# Patient Record
Sex: FEMALE | Race: WHITE | Employment: FULL TIME | URBAN - METROPOLITAN AREA
[De-identification: names, ages, dates, MRNs, and addresses within clinical notes are randomized per-mention and may not be internally consistent; named-entity substitution may affect disease eponyms.]

---

## 2017-04-15 RX ORDER — BUPROPION HYDROCHLORIDE 200 MG/1
TABLET, EXTENDED RELEASE ORAL
Qty: 180 TAB | Refills: 1 | Status: SHIPPED | OUTPATIENT
Start: 2017-04-15 | End: 2017-10-12 | Stop reason: SDUPTHER

## 2017-07-10 DIAGNOSIS — M54.2 NECK PAIN: Primary | ICD-10-CM

## 2017-08-04 ENCOUNTER — HOSPITAL ENCOUNTER (OUTPATIENT)
Dept: MAMMOGRAPHY | Age: 52
Discharge: HOME | End: 2017-08-04
Payer: COMMERCIAL

## 2017-08-04 DIAGNOSIS — Z12.31 VISIT FOR SCREENING MAMMOGRAM: ICD-10-CM

## 2017-08-04 PROCEDURE — 77067 SCR MAMMO BI INCL CAD: CPT

## 2017-11-01 RX ORDER — BUPROPION HYDROCHLORIDE 200 MG/1
TABLET, EXTENDED RELEASE ORAL
Qty: 180 TAB | Refills: 1 | Status: SHIPPED | OUTPATIENT
Start: 2017-11-01 | End: 2018-06-18 | Stop reason: SDUPTHER

## 2017-11-17 ENCOUNTER — APPOINTMENT (OUTPATIENT)
Dept: CT IMAGING | Age: 52
End: 2017-11-17
Attending: EMERGENCY MEDICINE
Payer: COMMERCIAL

## 2017-11-17 ENCOUNTER — HOSPITAL ENCOUNTER (OUTPATIENT)
Age: 52
Setting detail: OBSERVATION
Discharge: HOME | End: 2017-11-18
Attending: EMERGENCY MEDICINE | Admitting: FAMILY MEDICINE
Payer: COMMERCIAL

## 2017-11-17 DIAGNOSIS — R26.81 UNSTEADY GAIT: Primary | ICD-10-CM

## 2017-11-17 DIAGNOSIS — M54.2 NECK PAIN: ICD-10-CM

## 2017-11-17 DIAGNOSIS — G45.0 VERTEBROBASILAR ARTERY SYNDROME: ICD-10-CM

## 2017-11-17 PROBLEM — G45.9 TIA (TRANSIENT ISCHEMIC ATTACK): Status: ACTIVE | Noted: 2017-11-17

## 2017-11-17 LAB
ALBUMIN SERPL-MCNC: 4.1 G/DL (ref 3.5–5)
ALBUMIN/GLOB SERPL: 1.2 {RATIO} (ref 1.1–2.2)
ALP SERPL-CCNC: 61 U/L (ref 45–117)
ALT SERPL-CCNC: 39 U/L (ref 12–78)
ANION GAP SERPL CALC-SCNC: 7 MMOL/L (ref 5–15)
APTT PPP: 28 SEC (ref 22.1–32.5)
AST SERPL-CCNC: 21 U/L (ref 15–37)
BASOPHILS # BLD: 0 K/UL (ref 0–0.1)
BASOPHILS NFR BLD: 0 % (ref 0–1)
BILIRUB SERPL-MCNC: 0.6 MG/DL (ref 0.2–1)
BUN SERPL-MCNC: 15 MG/DL (ref 6–20)
BUN/CREAT SERPL: 16 (ref 12–20)
CALCIUM SERPL-MCNC: 9.5 MG/DL (ref 8.5–10.1)
CHLORIDE SERPL-SCNC: 104 MMOL/L (ref 97–108)
CO2 SERPL-SCNC: 29 MMOL/L (ref 21–32)
CREAT SERPL-MCNC: 0.94 MG/DL (ref 0.55–1.02)
EOSINOPHIL # BLD: 0.1 K/UL (ref 0–0.4)
EOSINOPHIL NFR BLD: 1 % (ref 0–7)
ERYTHROCYTE [DISTWIDTH] IN BLOOD BY AUTOMATED COUNT: 12.2 % (ref 11.5–14.5)
GLOBULIN SER CALC-MCNC: 3.4 G/DL (ref 2–4)
GLUCOSE SERPL-MCNC: 94 MG/DL (ref 65–100)
HCT VFR BLD AUTO: 40.9 % (ref 35–47)
HGB BLD-MCNC: 13.7 G/DL (ref 11.5–16)
INR BLD: 1 (ref 0.9–1.2)
INR PPP: 1.1 (ref 0.9–1.1)
LYMPHOCYTES # BLD: 1.5 K/UL (ref 0.8–3.5)
LYMPHOCYTES NFR BLD: 31 % (ref 12–49)
MCH RBC QN AUTO: 32.5 PG (ref 26–34)
MCHC RBC AUTO-ENTMCNC: 33.5 G/DL (ref 30–36.5)
MCV RBC AUTO: 96.9 FL (ref 80–99)
MONOCYTES # BLD: 0.4 K/UL (ref 0–1)
MONOCYTES NFR BLD: 7 % (ref 5–13)
NEUTS SEG # BLD: 3 K/UL (ref 1.8–8)
NEUTS SEG NFR BLD: 61 % (ref 32–75)
PLATELET # BLD AUTO: 221 K/UL (ref 150–400)
POTASSIUM SERPL-SCNC: 3.7 MMOL/L (ref 3.5–5.1)
PROT SERPL-MCNC: 7.5 G/DL (ref 6.4–8.2)
PROTHROMBIN TIME: 11.2 SEC (ref 9–11.1)
RBC # BLD AUTO: 4.22 M/UL (ref 3.8–5.2)
SODIUM SERPL-SCNC: 140 MMOL/L (ref 136–145)
THERAPEUTIC RANGE,PTTT: NORMAL SECS (ref 58–77)
TROPONIN I SERPL-MCNC: <0.04 NG/ML
WBC # BLD AUTO: 4.9 K/UL (ref 3.6–11)

## 2017-11-17 PROCEDURE — 70496 CT ANGIOGRAPHY HEAD: CPT

## 2017-11-17 PROCEDURE — 36415 COLL VENOUS BLD VENIPUNCTURE: CPT | Performed by: EMERGENCY MEDICINE

## 2017-11-17 PROCEDURE — 93005 ELECTROCARDIOGRAM TRACING: CPT

## 2017-11-17 PROCEDURE — 74011250637 HC RX REV CODE- 250/637: Performed by: FAMILY MEDICINE

## 2017-11-17 PROCEDURE — 85025 COMPLETE CBC W/AUTO DIFF WBC: CPT | Performed by: EMERGENCY MEDICINE

## 2017-11-17 PROCEDURE — 70450 CT HEAD/BRAIN W/O DYE: CPT

## 2017-11-17 PROCEDURE — 99285 EMERGENCY DEPT VISIT HI MDM: CPT

## 2017-11-17 PROCEDURE — 80053 COMPREHEN METABOLIC PANEL: CPT | Performed by: EMERGENCY MEDICINE

## 2017-11-17 PROCEDURE — 74011636320 HC RX REV CODE- 636/320: Performed by: EMERGENCY MEDICINE

## 2017-11-17 PROCEDURE — 70498 CT ANGIOGRAPHY NECK: CPT

## 2017-11-17 PROCEDURE — 99218 HC RM OBSERVATION: CPT

## 2017-11-17 PROCEDURE — 85730 THROMBOPLASTIN TIME PARTIAL: CPT | Performed by: EMERGENCY MEDICINE

## 2017-11-17 PROCEDURE — 85610 PROTHROMBIN TIME: CPT | Performed by: EMERGENCY MEDICINE

## 2017-11-17 PROCEDURE — 74011000258 HC RX REV CODE- 258: Performed by: EMERGENCY MEDICINE

## 2017-11-17 PROCEDURE — 84484 ASSAY OF TROPONIN QUANT: CPT | Performed by: EMERGENCY MEDICINE

## 2017-11-17 PROCEDURE — 85610 PROTHROMBIN TIME: CPT

## 2017-11-17 RX ORDER — DIAZEPAM 5 MG/1
5 TABLET ORAL
Status: DISCONTINUED | OUTPATIENT
Start: 2017-11-17 | End: 2017-11-17

## 2017-11-17 RX ORDER — SODIUM CHLORIDE 9 MG/ML
75 INJECTION, SOLUTION INTRAVENOUS CONTINUOUS
Status: DISCONTINUED | OUTPATIENT
Start: 2017-11-17 | End: 2017-11-18 | Stop reason: HOSPADM

## 2017-11-17 RX ORDER — ESCITALOPRAM OXALATE 10 MG/1
10 TABLET ORAL DAILY
Status: DISCONTINUED | OUTPATIENT
Start: 2017-11-18 | End: 2017-11-17

## 2017-11-17 RX ORDER — ALPRAZOLAM 0.25 MG/1
0.25 TABLET ORAL
Status: DISCONTINUED | OUTPATIENT
Start: 2017-11-17 | End: 2017-11-18 | Stop reason: HOSPADM

## 2017-11-17 RX ORDER — BUPROPION HYDROCHLORIDE 100 MG/1
200 TABLET, EXTENDED RELEASE ORAL DAILY
Status: DISCONTINUED | OUTPATIENT
Start: 2017-11-18 | End: 2017-11-17

## 2017-11-17 RX ORDER — PRAVASTATIN SODIUM 40 MG/1
40 TABLET ORAL
Status: DISCONTINUED | OUTPATIENT
Start: 2017-11-17 | End: 2017-11-18 | Stop reason: HOSPADM

## 2017-11-17 RX ORDER — ACETAMINOPHEN 325 MG/1
650 TABLET ORAL
Status: DISCONTINUED | OUTPATIENT
Start: 2017-11-17 | End: 2017-11-18 | Stop reason: HOSPADM

## 2017-11-17 RX ORDER — ONDANSETRON 2 MG/ML
4 INJECTION INTRAMUSCULAR; INTRAVENOUS
Status: DISCONTINUED | OUTPATIENT
Start: 2017-11-17 | End: 2017-11-18 | Stop reason: HOSPADM

## 2017-11-17 RX ORDER — GUAIFENESIN 100 MG/5ML
81 LIQUID (ML) ORAL DAILY
Status: DISCONTINUED | OUTPATIENT
Start: 2017-11-18 | End: 2017-11-18 | Stop reason: HOSPADM

## 2017-11-17 RX ORDER — SODIUM CHLORIDE 0.9 % (FLUSH) 0.9 %
10 SYRINGE (ML) INJECTION
Status: COMPLETED | OUTPATIENT
Start: 2017-11-17 | End: 2017-11-17

## 2017-11-17 RX ORDER — SODIUM CHLORIDE 0.9 % (FLUSH) 0.9 %
5-10 SYRINGE (ML) INJECTION AS NEEDED
Status: DISCONTINUED | OUTPATIENT
Start: 2017-11-17 | End: 2017-11-18 | Stop reason: HOSPADM

## 2017-11-17 RX ORDER — SODIUM CHLORIDE 0.9 % (FLUSH) 0.9 %
5-10 SYRINGE (ML) INJECTION EVERY 8 HOURS
Status: DISCONTINUED | OUTPATIENT
Start: 2017-11-17 | End: 2017-11-18 | Stop reason: HOSPADM

## 2017-11-17 RX ORDER — FEXOFENADINE HCL AND PSEUDOEPHEDRINE HCI 180; 240 MG/1; MG/1
1 TABLET, EXTENDED RELEASE ORAL DAILY
COMMUNITY
End: 2017-11-22 | Stop reason: ALTCHOICE

## 2017-11-17 RX ORDER — RANITIDINE 150 MG/1
150 TABLET, FILM COATED ORAL
COMMUNITY
End: 2022-03-02

## 2017-11-17 RX ORDER — ACETAMINOPHEN 650 MG/1
650 SUPPOSITORY RECTAL
Status: DISCONTINUED | OUTPATIENT
Start: 2017-11-17 | End: 2017-11-18 | Stop reason: HOSPADM

## 2017-11-17 RX ORDER — FAMOTIDINE 20 MG/1
40 TABLET, FILM COATED ORAL DAILY
Status: DISCONTINUED | OUTPATIENT
Start: 2017-11-18 | End: 2017-11-18 | Stop reason: HOSPADM

## 2017-11-17 RX ADMIN — Medication 10 ML: at 20:13

## 2017-11-17 RX ADMIN — ALPRAZOLAM 0.25 MG: 0.25 TABLET ORAL at 23:30

## 2017-11-17 RX ADMIN — Medication 10 ML: at 23:08

## 2017-11-17 RX ADMIN — SODIUM CHLORIDE 100 ML: 900 INJECTION, SOLUTION INTRAVENOUS at 20:13

## 2017-11-17 RX ADMIN — IOPAMIDOL 100 ML: 755 INJECTION, SOLUTION INTRAVENOUS at 20:13

## 2017-11-17 NOTE — IP AVS SNAPSHOT
[address] 
[phone] Patient: Ines Soriano MRN: [id number] :1965 My Medications TAKE these medications as instructed Instructions Each Dose to Equal  
 Morning Noon Evening Bedtime ALEVE 220 mg tablet Generic drug:  naproxen sodium Your last dose was: Your next dose is: Take 220 mg by mouth two (2) times daily as needed. 220 mg  
    
   
   
   
  
 ALLEGRA-D 24 HOUR 180-240 mg per tablet Generic drug:  fexofenadine-pseudoephedrine Your last dose was: Your next dose is: Take 1 Tab by mouth daily. 1 Tab ALPRAZolam 0.25 mg tablet Commonly known as:  Kira Saenz Your last dose was: Your next dose is: TAKE 1/2 TO 1 TABLET BY MOUTH TWICE A DAY AS NEEDED FOR ANXIETY  
     
   
   
   
  
 aspirin 81 mg chewable tablet Start taking on:  2017 Your last dose was: Your next dose is: Take 1 Tab by mouth daily. Can be obtained over-the-counter 81 mg  
    
   
   
   
  
 buPROPion  mg SR tablet Commonly known as:  Kayleen Prajapati Your last dose was: Your next dose is: TAKE 2 TABLETS DAILY FOR ANXIETY WITH DEPRESSION  
     
   
   
   
  
 ZANTAC 150 mg tablet Generic drug:  raNITIdine Your last dose was: Your next dose is: Take 150 mg by mouth five (5) days a week. Indications: pt takes 150 mg in the morning 5/7 days of the week 150 mg Where to Get Your Medications Information on where to get these meds will be given to you by the nurse or doctor. ! Ask your nurse or doctor about these medications  
  aspirin 81 mg chewable tablet

## 2017-11-17 NOTE — IP AVS SNAPSHOT
[address] 
[phone] Patient: Arvin Stevens MRN: [id number] :1965 About your hospitalization You were admitted on:  2017 You last received care in the:  [facility] 6S NEURO-SCI TELE You were discharged on:  2017 Why you were hospitalized Your primary diagnosis was:  Vertebrobasilar Artery Syndrome Things You Need To Do (next 8 weeks) Schedule an appointment with Aston Cantu NP as soon as possible for a visit in 1 week(s) For follow up after hospitalization Phone:  [phone] Where:  [facility] [phone] Discharge Orders None A check kem indicates which time of day the medication should be taken. My Medications TAKE these medications as instructed Instructions Each Dose to Equal  
 Morning Noon Evening Bedtime ALEVE 220 mg tablet Generic drug:  naproxen sodium Your last dose was: Your next dose is: Take 220 mg by mouth two (2) times daily as needed. 220 mg  
    
   
   
   
  
 ALLEGRA-D 24 HOUR 180-240 mg per tablet Generic drug:  fexofenadine-pseudoephedrine Your last dose was: Your next dose is: Take 1 Tab by mouth daily. 1 Tab ALPRAZolam 0.25 mg tablet Commonly known as:  Darcie Henley Your last dose was: Your next dose is: TAKE 1/2 TO 1 TABLET BY MOUTH TWICE A DAY AS NEEDED FOR ANXIETY  
     
   
   
   
  
 aspirin 81 mg chewable tablet Start taking on:  2017 Your last dose was: Your next dose is: Take 1 Tab by mouth daily. Can be obtained over-the-counter 81 mg  
    
   
   
   
  
 buPROPion  mg SR tablet Commonly known as:  Noel Badillo Your last dose was: Your next dose is: TAKE 2 TABLETS DAILY FOR ANXIETY WITH DEPRESSION  
     
   
   
   
  
 ZANTAC 150 mg tablet Generic drug:  raNITIdine Your last dose was: Your next dose is: Take 150 mg by mouth five (5) days a week. Indications: pt takes 150 mg in the morning 5/7 days of the week 150 mg Where to Get Your Medications Information on where to get these meds will be given to you by the nurse or doctor. ! Ask your nurse or doctor about these medications  
  aspirin 81 mg chewable tablet Discharge Instructions Please bring this form with you to show your primary care provider at your follow-up appointment. Primary care provider:  Dr. Brian Rudolph NP Discharging provider:  Kimi Mascorro MD 
 
You have been admitted to the hospital with the following diagnoses: · TIA (transient ischemic attack) FOLLOW-UP CARE RECOMMENDATIONS: 
 
APPOINTMENTS: 
Follow-up Information Follow up With Details Comments Contact Info Brian Rudolph NP Schedule an appointment as soon as possible for a visit in 1 week For follow up after hospitalization [address] 
[phone] SYMPTOMS to watch for: dizziness, numbness, change in mentation, falling, weakness. DIET/what to eat:  Cardiac Diet ACTIVITY:  Activity as tolerated What to do if new or unexpected symptoms occur? If you experience any of the above symptoms (or should other concerns or questions arise after discharge) please call your primary care physician. Return to the emergency room if you cannot get hold of your doctor. · It is very important that you keep your follow-up appointment(s). · Please bring discharge papers, medication list (and/or medication bottles) to your follow-up appointments for review by your outpatient provider(s).  
· Please check the list of medications and be sure it includes every medication (even non-prescription medications) that your provider wants you to take. · It is important that you take the medication exactly as they are prescribed. · Keep your medication in the bottles provided by the pharmacist and keep a list of the medication names, dosages, and times to be taken in your wallet. · Do not take other medications without consulting your doctor. · If you have any questions about your medications or other instructions, please talk to your nurse or care provider before you leave the hospital. 
 
I understand that if any problems occur once I am at home I am to contact my physician. These instructions were explained to me and I had the opportunity to ask questions. [employer] Announcement We are excited to announce that we are making your provider's discharge notes available to you in [employer]. You will see these notes when they are completed and signed by the physician that discharged you from your recent hospital stay. If you have any questions or concerns about any information you see in [employer], please call the Health Information Department where you were seen or reach out to your Primary Care Provider for more information about your plan of care. Introducing [facility]! Dear Casey Forrest: Thank you for requesting a [employer] account. Our records indicate that you already have an active [employer] account. You can access your account anytime at [email or web address] Did you know that you can access your hospital and ER discharge instructions at any time in [employer]? You can also review all of your test results from your hospital stay or ER visit. Additional Information If you have questions, please visit the Frequently Asked Questions section of the [employer] website at [email or web address] Remember, [employer] is NOT to be used for urgent needs. For medical emergencies, dial 911. Now available from your iPhone and Android! Unresulted Labs-Please follow up with your PCP about these lab tests Order Current Status EKG, 12 LEAD, INITIAL Preliminary result Providers Seen During Your Hospitalization Provider Specialty Primary office phone Carlee Salmeron MD Emergency Medicine [phone] Lester Costello MD Hospitalist [phone] Maile Manzano MD Internal Medicine [phone] Immunizations Administered for This Admission Name Date Influenza Vaccine (Quad) PF 11/18/2017 Your Primary Care Physician (PCP) Primary Care Physician Office Phone Office Fax Lida Chun [phone] You are allergic to the following No active allergies Recent Documentation Height Weight BMI OB Status Smoking Status 1.727 m 63.5 kg 21.29 kg/m2 Hysterectomy Never Smoker Emergency Contacts Name Discharge Info Relation Home Work Mobile Jocelyn CAREGIVER [3] Spouse [3] [phone] Patient Belongings The following personal items are in your possession at time of discharge: 
  Dental Appliances: None  Visual Aid: Glasses, With patient      Home Medications: None   Jewelry: Ring  Clothing: None    Other Valuables: None Please provide this summary of care documentation to your next provider. Signatures-by signing, you are acknowledging that this After Visit Summary has been reviewed with you and you have received a copy. Patient Signature:  ____________________________________________________________ Date:  ____________________________________________________________  
  
Matthew Police Provider Signature:  ____________________________________________________________ Date:  ____________________________________________________________

## 2017-11-18 ENCOUNTER — APPOINTMENT (OUTPATIENT)
Dept: MRI IMAGING | Age: 52
End: 2017-11-18
Attending: FAMILY MEDICINE
Payer: COMMERCIAL

## 2017-11-18 VITALS
DIASTOLIC BLOOD PRESSURE: 62 MMHG | HEART RATE: 73 BPM | BODY MASS INDEX: 21.22 KG/M2 | WEIGHT: 140 LBS | TEMPERATURE: 98.4 F | SYSTOLIC BLOOD PRESSURE: 109 MMHG | OXYGEN SATURATION: 97 % | RESPIRATION RATE: 16 BRPM | HEIGHT: 68 IN

## 2017-11-18 PROBLEM — G45.0 VERTEBROBASILAR ARTERY SYNDROME: Status: ACTIVE | Noted: 2017-11-17

## 2017-11-18 LAB
CHOLEST SERPL-MCNC: 182 MG/DL
ERYTHROCYTE [DISTWIDTH] IN BLOOD BY AUTOMATED COUNT: 12.1 % (ref 11.5–14.5)
EST. AVERAGE GLUCOSE BLD GHB EST-MCNC: 105 MG/DL
GLUCOSE BLD STRIP.AUTO-MCNC: 107 MG/DL (ref 65–100)
GLUCOSE BLD STRIP.AUTO-MCNC: 69 MG/DL (ref 65–100)
GLUCOSE BLD STRIP.AUTO-MCNC: 73 MG/DL (ref 65–100)
GLUCOSE BLD STRIP.AUTO-MCNC: 92 MG/DL (ref 65–100)
HBA1C MFR BLD: 5.3 % (ref 4.2–6.3)
HCT VFR BLD AUTO: 39.8 % (ref 35–47)
HDLC SERPL-MCNC: 99 MG/DL
HDLC SERPL: 1.8 {RATIO} (ref 0–5)
HGB BLD-MCNC: 13.5 G/DL (ref 11.5–16)
LDLC SERPL CALC-MCNC: 75.8 MG/DL (ref 0–100)
LIPID PROFILE,FLP: NORMAL
MCH RBC QN AUTO: 32.8 PG (ref 26–34)
MCHC RBC AUTO-ENTMCNC: 33.9 G/DL (ref 30–36.5)
MCV RBC AUTO: 96.6 FL (ref 80–99)
PLATELET # BLD AUTO: 209 K/UL (ref 150–400)
RBC # BLD AUTO: 4.12 M/UL (ref 3.8–5.2)
SERVICE CMNT-IMP: ABNORMAL
SERVICE CMNT-IMP: NORMAL
TRIGL SERPL-MCNC: 36 MG/DL (ref ?–150)
TROPONIN I SERPL-MCNC: <0.04 NG/ML
TSH SERPL DL<=0.05 MIU/L-ACNC: 3.06 UIU/ML (ref 0.36–3.74)
VLDLC SERPL CALC-MCNC: 7.2 MG/DL
WBC # BLD AUTO: 4.6 K/UL (ref 3.6–11)

## 2017-11-18 PROCEDURE — G8980 MOBILITY D/C STATUS: HCPCS

## 2017-11-18 PROCEDURE — 74011250636 HC RX REV CODE- 250/636: Performed by: FAMILY MEDICINE

## 2017-11-18 PROCEDURE — 99218 HC RM OBSERVATION: CPT

## 2017-11-18 PROCEDURE — 74011250637 HC RX REV CODE- 250/637: Performed by: HOSPITALIST

## 2017-11-18 PROCEDURE — 74011250637 HC RX REV CODE- 250/637: Performed by: FAMILY MEDICINE

## 2017-11-18 PROCEDURE — 70551 MRI BRAIN STEM W/O DYE: CPT

## 2017-11-18 PROCEDURE — 96361 HYDRATE IV INFUSION ADD-ON: CPT

## 2017-11-18 PROCEDURE — 96372 THER/PROPH/DIAG INJ SC/IM: CPT

## 2017-11-18 PROCEDURE — 84443 ASSAY THYROID STIM HORMONE: CPT | Performed by: FAMILY MEDICINE

## 2017-11-18 PROCEDURE — 82962 GLUCOSE BLOOD TEST: CPT

## 2017-11-18 PROCEDURE — 84484 ASSAY OF TROPONIN QUANT: CPT | Performed by: FAMILY MEDICINE

## 2017-11-18 PROCEDURE — 85027 COMPLETE CBC AUTOMATED: CPT | Performed by: FAMILY MEDICINE

## 2017-11-18 PROCEDURE — G8979 MOBILITY GOAL STATUS: HCPCS

## 2017-11-18 PROCEDURE — 96360 HYDRATION IV INFUSION INIT: CPT

## 2017-11-18 PROCEDURE — 80061 LIPID PANEL: CPT | Performed by: FAMILY MEDICINE

## 2017-11-18 PROCEDURE — G8978 MOBILITY CURRENT STATUS: HCPCS

## 2017-11-18 PROCEDURE — 36415 COLL VENOUS BLD VENIPUNCTURE: CPT | Performed by: FAMILY MEDICINE

## 2017-11-18 PROCEDURE — 90471 IMMUNIZATION ADMIN: CPT

## 2017-11-18 PROCEDURE — 90686 IIV4 VACC NO PRSV 0.5 ML IM: CPT | Performed by: FAMILY MEDICINE

## 2017-11-18 PROCEDURE — 74011250636 HC RX REV CODE- 250/636: Performed by: HOSPITALIST

## 2017-11-18 PROCEDURE — 97161 PT EVAL LOW COMPLEX 20 MIN: CPT

## 2017-11-18 PROCEDURE — 83036 HEMOGLOBIN GLYCOSYLATED A1C: CPT | Performed by: FAMILY MEDICINE

## 2017-11-18 RX ORDER — ENOXAPARIN SODIUM 100 MG/ML
40 INJECTION SUBCUTANEOUS EVERY 24 HOURS
Status: DISCONTINUED | OUTPATIENT
Start: 2017-11-18 | End: 2017-11-18 | Stop reason: HOSPADM

## 2017-11-18 RX ORDER — GUAIFENESIN 100 MG/5ML
81 LIQUID (ML) ORAL DAILY
Qty: 100 TAB | Refills: 0 | Status: SHIPPED | OUTPATIENT
Start: 2017-11-19 | End: 2022-02-28

## 2017-11-18 RX ORDER — ALPRAZOLAM 0.5 MG/1
0.5 TABLET ORAL ONCE
Status: COMPLETED | OUTPATIENT
Start: 2017-11-18 | End: 2017-11-18

## 2017-11-18 RX ADMIN — LORATADINE, PSEUDOEPHEDRINE 1 TABLET: 5; 120 TABLET, EXTENDED RELEASE ORAL at 13:45

## 2017-11-18 RX ADMIN — FAMOTIDINE 40 MG: 20 TABLET, FILM COATED ORAL at 10:52

## 2017-11-18 RX ADMIN — Medication 10 ML: at 06:40

## 2017-11-18 RX ADMIN — Medication 10 ML: at 13:47

## 2017-11-18 RX ADMIN — ASPIRIN 81 MG 81 MG: 81 TABLET ORAL at 10:53

## 2017-11-18 RX ADMIN — SODIUM CHLORIDE 75 ML/HR: 900 INJECTION, SOLUTION INTRAVENOUS at 13:41

## 2017-11-18 RX ADMIN — ENOXAPARIN SODIUM 40 MG: 40 INJECTION SUBCUTANEOUS at 10:53

## 2017-11-18 RX ADMIN — SODIUM CHLORIDE 75 ML/HR: 900 INJECTION, SOLUTION INTRAVENOUS at 00:16

## 2017-11-18 RX ADMIN — BUPROPION HYDROCHLORIDE 400 MG: 100 TABLET, FILM COATED, EXTENDED RELEASE ORAL at 10:51

## 2017-11-18 RX ADMIN — ALPRAZOLAM 0.5 MG: 0.5 TABLET ORAL at 14:28

## 2017-11-18 RX ADMIN — INFLUENZA VIRUS VACCINE 0.5 ML: 15; 15; 15; 15 SUSPENSION INTRAMUSCULAR at 17:34

## 2017-11-18 NOTE — ED TRIAGE NOTES
Pt states that at 11:30 while at work she lost her balance and sat in her chair and continued to work all day. On her lunch break she went to the school nurse, her BP was 160/90. After work she went to [facility] to check her BP which was high. She then went to [facility] and they called EMS and was sent to the ED. Pt states that she feels like something is behind her eye but no headache, pressure or pain.

## 2017-11-18 NOTE — CONSULTS
[facility] [address] [facility]   [address]       Name:  Radha Ventura   MR#:  [id number]   :  1965   Account #:  [de-identified]    Date of Consultation:  2017   Date of Adm:  2017       CHIEF COMPLAINT: Balance problems. HISTORY OF PRESENT ILLNESS: The patient is a 69-year-old   female with past medical history of paroxysmal atrial flutter, status post   ablation, depression, , who presents to the hospital with the above   mentioned symptoms. 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 \" The patient reports   that she caught herself. The symptoms started around 11:30 and the   patient reports that she spontaneously got better. It lasted for a few   minutes. The patient reports then she took her class to lunch and went   to the nurse's office and found her blood pressure to be 160/90 before   lunch and 140/78 after lunch. The patient also reports that she had   \"some fogginess\" behind her left eye, some neck pain and trouble with   \"finding exact words for what I wanted to say. \" The patient reports she   came home and went to [facility] this evening to check her blood pressure   and it was 158/90 which is very unusual for the patient. She got   concerned, went to the [facility] and was referred to the ER for   further management and evaluation. Currently, the patient reports all   her symptoms have resolved. She denies any headache, blurry vision,   sore throat, trouble swallowing, trouble with speech, any chest pain,   shortness of breath, cough, fever, chills, abdominal pain, constipation,   diarrhea, urinary symptoms, focal or generalized neurological   weakness, recent travel, sick contacts, falls, injuries, hematemesis,   melena, hemoptysis or any other concerns or problems. PAST MEDICAL HISTORY: See above.     HOME MEDICATIONS: Currently the patient is on:   1. Wellbutrin 200 mg daily. 2. Estradiol. 3. Xanax 0.125 mg to 0.25 mg by mouth twice daily as needed for   anxiety. 4. Diazepam 5 mg daily as needed. 5. Lexapro 5 mg daily. 6. Famotidine 40 mg daily. 7. Aleve as needed. SOCIAL HISTORY: Denies tobacco abuse. Occasional alcohol. Denies   IV drug abuse. ALLERGIES: NO KNOWN DRUG ALLERGIES. REVIEW OF SYSTEMS   All systems were reviewed and were found to be essentially negative   except for the symptoms mentioned above. FAMILY HISTORY: Father had history of small cell lung cancer. Father   had history of heart disease. Father had history of quadruple bypass. Father had a history of gout, and diabetes. PHYSICAL EXAMINATION   VITAL SIGNS: Temperature 98.5, pulse 68, respiratory rate 13, blood   pressure 117/69, pulse oximetry 98% on room air. GENERAL: Alert and oriented x3, awake, no acute distress, resting in   bed, pleasant female, appears to be stated age. HEENT: Pupils equal and reactive to light. Dry mucous membranes. Tympanic membranes clear. NECK: Supple. No meningeal signs were noted. CHEST: Clear to auscultation bilaterally. CORONARY: S1, S2 were heard. ABDOMEN: Soft, nontender, nondistended. Bowel sounds are   physiological.   EXTREMITIES: No clubbing, no cyanosis, no edema. NEUROPSYCHIATRIC: Pleasant mood and affect. Cranial nerves 2-  12 grossly intact. Sensory grossly within normal limits. DTR 2+. Strength 5/5 bilaterally. Moves all 4. Sensory grossly within normal   limits. SKIN: Warm. LABORATORY DATA: White count 4.9, hemoglobin 13.7, hematocrit   40.8, platelets 946. INR 1.1. Sodium 140, potassium 4.7, chloride 104,   bicarbonate 29, anion gap 7, glucose 94, BUN 16, creatinine 0.94,   calcium 9.5, bilirubin total 0.7, albumin 1.1, ALT is 29, AST 21, alkaline   phosphatase 61. Troponin less than 0.04. INR is 1.  CT code neuro   shows no evidence of acute intracranial abnormalities. CT of the head   and neck shows no emergent process in the preliminary read. EKG   shows no acute abnormality. ASSESSMENT AND PLAN:   1. Transient ischemic attack. The patient will be observed on a   telemetry bed. Will get an MRI of the brain. CT and CTA of the head   and neck are negative. We will start the patient on aspirin and statin. Neurovascular checks, Neurology consult, echocardiogram,   hemoglobin A1c. Will continue to closely monitor. Further intervention   will be per hospital course. I believe a cause of these symptoms is   anxiety and will continue the patient's home medication. Further   intervention will be per hospital course . 2. History of anxiety/depression. Continue home medications. Continue   to monitor. 3.  deep vein thrombosis prophylaxis .          Carrie Gillespie MD      MM / CD   D:  11/17/2017   22:22   T:  11/18/2017   06:22   Job #:  [id number]

## 2017-11-18 NOTE — PROCEDURES
[address] [place], [address] [address] REPORT       Name:  Radha Gee   MR#:  [id number]   :  1965   Account #:  [de-identified]    Date of Procedure:  2017   Date of Adm:  2017       INDICATION FOR STUDY: TIA. A 2-dimensional echocardiogram with color flow, M-mode, and Doppler   was performed. The acoustic window was of good quality. Left ventricular systolic function and ejection fraction are normal. Left   ventricular size is normal. The estimated left ventricular ejection   fraction is 60% to 65%. There is no significant LVH. Wall thickness   appears to be normal. Left atrial, right atrial, and right ventricular sizes   are normal. Right ventricular function is normal.    The aortic valve was structurally normal, without stenosis or   regurgitation. The mitral valve was structurally normal, without   stenosis. There is mild regurgitation. There is no prolapse. Tricuspid   valve was structurally normal, with no stenosis or significant   regurgitation. The pulmonic valve was not well seen. The aortic root size, IVC, and pericardium are normal.    CONCLUSION   1. Normal left ventricular systolic function. 2. Mild mitral insufficiency.         Jeanette Gayle MD      VKS / CO   D:  2017   15:52   T:  2017   16:05   Job #:  [id number]

## 2017-11-18 NOTE — PROGRESS NOTES
Problem: Falls - Risk of  Goal: *Absence of Falls  Document Ranjana Fall Risk and appropriate interventions in the flowsheet.    Outcome: Progressing Towards Goal  Fall Risk Interventions:            Medication Interventions: Patient to call before getting OOB, Teach patient to arise slowly, Evaluate medications/consider consulting pharmacy

## 2017-11-18 NOTE — DISCHARGE INSTRUCTIONS
Please bring this form with you to show your primary care provider at your follow-up appointment. Primary care provider:  Dr. Sofia Mayo NP    Discharging provider:  Marie Thurston MD    You have been admitted to the hospital with the following diagnoses:  · TIA (transient ischemic attack)    FOLLOW-UP CARE RECOMMENDATIONS:    APPOINTMENTS:  Follow-up Information     Follow up With Details Comments Evelia Cisneros NP Schedule an appointment as soon as possible for a visit in 1 week For follow up after hospitalization [address] [place] [address] Drive  [phone]           SYMPTOMS to watch for: dizziness, numbness, change in mentation, falling, weakness. DIET/what to eat:  Cardiac Diet    ACTIVITY:  Activity as tolerated    What to do if new or unexpected symptoms occur? If you experience any of the above symptoms (or should other concerns or questions arise after discharge) please call your primary care physician. Return to the emergency room if you cannot get hold of your doctor. · It is very important that you keep your follow-up appointment(s). · Please bring discharge papers, medication list (and/or medication bottles) to your follow-up appointments for review by your outpatient provider(s). · Please check the list of medications and be sure it includes every medication (even non-prescription medications) that your provider wants you to take. · It is important that you take the medication exactly as they are prescribed. · Keep your medication in the bottles provided by the pharmacist and keep a list of the medication names, dosages, and times to be taken in your wallet. · Do not take other medications without consulting your doctor.    · If you have any questions about your medications or other instructions, please talk to your nurse or care provider before you leave the hospital.    I understand that if any problems occur once I am at home I am to contact my physician. These instructions were explained to me and I had the opportunity to ask questions.

## 2017-11-18 NOTE — PROGRESS NOTES
Kevin Dos Santos MD   Phone/text: [phone] (7am to 7pm)  After 7pm please call  for hospitalist on call    Hospitalist Progress Note     2017   PCP:  Dr. Namrata Mccullough NP  Chief complaint: Balance problems    Admission Summary:   The patient is a 60-year-old female with past medical history of paroxysmal atrial flutter, status post ablation, depression, who presents to the hospital with balance problems. 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 VTE prophylaxis: enoxaparin  Discussed plan of care with Patient/Family and Nurse   Pre-admission lived at home  Discharge plan: home  Estimated time to discharge: 1-2 days     Subjective   Ms. Jocelyne Jamil is feeling fine. No headache, weakness, imbalance, vertigo, vision changes.     Reviewed interval history  Physical examination     Visit Vitals    /68 (BP 1 Location: Left arm, BP Patient Position: At rest)    Pulse 60    Temp 98 °F (36.7 °C)    Resp 10    Ht 5' 8\" (1.727 m)    Wt 63.5 kg (140 lb)    SpO2 99%    BMI 21.29 kg/m2       Temp (24hrs), Av.3 °F (36.8 °C), Min:98 °F (36.7 °C), Max:98.5 °F (36.9 °C)    No intake or output data in the 24 hours ending 17 0841    Gen - NAD  HEENT - MMM  Neck - supple, full ROM  CV - RRR, no MRG  Resp - lungs CTA b/l, no wheezing, normal WOB  GI - abdomen S, NT, ND, +BS. No hepatosplenomegaly   - no CVA tenderness, bladder non-palpable in lower abdomen  MSK - normal tone and bulk, no edema  Neuro - A&O, no focal deficits  Psych - calm and cooperative with normal affect    Data Review       Telemetry x   ECG    Xray    CT scan x   MRI    Echocardiogram    Ultrasound              I have reviewed the flow sheet and recent notes  New labs and data personally reviewed.     Recent Labs      11/18/17   0020  11/17/17 1922   WBC  4.6  4.9   HGB  13.5  13.7   HCT  39.8  40.9   PLT  209  221     Recent Labs      11/17/17 1923 11/17/17 1922   NA   --   140   K   --   3.7   CL   --   104   CO2   --   29   GLU   --   94   BUN   --   15   CREA   --   0.94   CA   --   9.5   ALB   --   4.1   TBILI   --   0.6   SGOT   --   21   ALT   --   39   INR  1.0  1.1       Medications reviewed  Current Facility-Administered Medications   Medication Dose Route Frequency    ALPRAZolam (XANAX) tablet 0.25 mg  0.25 mg Oral TID PRN    famotidine (PEPCID) tablet 40 mg  40 mg Oral DAILY    sodium chloride (NS) flush 5-10 mL  5-10 mL IntraVENous Q8H    sodium chloride (NS) flush 5-10 mL  5-10 mL IntraVENous PRN    acetaminophen (TYLENOL) tablet 650 mg  650 mg Oral Q4H PRN    Or    acetaminophen (TYLENOL) solution 650 mg  650 mg Per NG tube Q4H PRN    Or    acetaminophen (TYLENOL) suppository 650 mg  650 mg Rectal Q4H PRN    0.9% sodium chloride infusion  75 mL/hr IntraVENous CONTINUOUS    ondansetron (ZOFRAN) injection 4 mg  4 mg IntraVENous Q6H PRN    aspirin chewable tablet 81 mg  81 mg Oral DAILY    pravastatin (PRAVACHOL) tablet 40 mg  40 mg Oral QHS    influenza vaccine 2017-18 (3 yrs+)(PF) (FLUZONE QUAD/FLUARIX QUAD) injection 0.5 mL  0.5 mL IntraMUSCular PRIOR TO DISCHARGE    buPROPion SR (WELLBUTRIN SR) tablet 400 mg  400 mg Oral DAILY         Mazin Lui MD  Internal Medicine  11/18/2017

## 2017-11-18 NOTE — PROGRESS NOTES
Bedside and Verbal shift change report given to Thomas Zuñiga (oncoming nurse) by Ildefonso Ha (offgoing nurse). Report included the following information SBAR, Kardex, ED Summary, MAR, Accordion, Recent Results and Cardiac Rhythm NSR.

## 2017-11-18 NOTE — ED NOTES
1945: Assumed care of pt, pt resting on stretcher on the monitor x3. Son at bedside. Pt states that her L eye feels blurry posteriorly. 2010: Pt in CT  2145: Pt provided with water and ice chips, family remains at bedside, updated pt on bed placement   2200: Deyanira at bedside  2230: Patient left department with tech on cardiac monitor for transportation to inpatient bed. Patient's VS at the time of transfer were /69, 98 on RA, denies pain at this time, 98.4 orally, HR 68 normal sinus rhythm on the monitor. Patient was alert and oriented x 4 and denies further needs at time of transfer. TRANSFER - OUT REPORT:    Verbal report given to [facility], RN(name) on Galina Moss  being transferred to [facility](unit) for routine progression of care       Report consisted of patients Situation, Background, Assessment and   Recommendations(SBAR). Information from the following report(s) SBAR, Kardex, ED Summary, [facility] and Recent Results was reviewed with the receiving nurse. Opportunity for questions and clarification was provided.

## 2017-11-18 NOTE — PROGRESS NOTES
Primary Nurse Barry Reed and Amy Chaudhary RN performed a dual skin assessment on this patient No impairment noted  Tommy score is 23

## 2017-11-18 NOTE — CONSULTS
NEUROLOGY  11/18/2017     Consulted by: Nicholas Yoo*        Patient ID:  Femi Burroughs  [id number]  47 y.o.  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 She felt like she was pushed to her right. She went to see the school nurse and her blood pressure was elevated. At some point she began to feel a sense of fullness behind the left eye. Not painful. No double vision or slurred speech. No unusual unilateral weakness or numbness. She also has noticed some increasing word finding difficulty in recent months. She does not take aspirin. She had a blood pressure checked multiple times and he continued to be elevated over 160. Typically her pressure ranges in the 120s or less. She feels her baseline now. No prior symptoms of this. Review of Systems   HENT: Positive for ear pain. Neurological:        Imbalanced   All other systems reviewed and are negative. Past Medical History:   Diagnosis Date    Depression 12/08    Menopause 2006    Paroxysmal atrial flutter [facility])     resolved with ablation 2/2010 dr orellana     Family History   Problem Relation Age of Onset    Cancer Father      small cell lung    Heart Disease Father 71     in 45s, smoker    Heart Attack Father      quad bypass     Gout Father     Diabetes Father      ??/mild (later onset)     Alzheimer Maternal Grandmother     Cancer Paternal Grandfather      leukemia     Heart Disease Paternal Grandmother      older age related chf    Sheryl Adelina Migraines Sister      Social History     Social History    Marital status:      Spouse name: N/A    Number of children: N/A    Years of education: N/A     Occupational History    Not on file.      Social History Main Topics    Smoking status: Never Smoker    Smokeless tobacco: Never Used    Alcohol use 1.5 oz/week     3 drink(s) per week      Comment: maybe 2-3 glasses wine/week    Drug use: Not on file    Sexual activity: Not on file     Other Topics Concern    Not on file     Social History Narrative     Current Facility-Administered Medications   Medication Dose Route Frequency    enoxaparin (LOVENOX) injection 40 mg  40 mg SubCUTAneous Q24H    ALPRAZolam (XANAX) tablet 0.5 mg  0.5 mg Oral ONCE    loratadine-pseudoephedrine (CLARITIN-D 12-hour) 5-120 mg per tablet 1 Tab  1 Tab Oral BID    ALPRAZolam (XANAX) tablet 0.25 mg  0.25 mg Oral TID PRN    famotidine (PEPCID) tablet 40 mg  40 mg Oral DAILY    sodium chloride (NS) flush 5-10 mL  5-10 mL IntraVENous Q8H    sodium chloride (NS) flush 5-10 mL  5-10 mL IntraVENous PRN    acetaminophen (TYLENOL) tablet 650 mg  650 mg Oral Q4H PRN    Or    acetaminophen (TYLENOL) solution 650 mg  650 mg Per NG tube Q4H PRN    Or    acetaminophen (TYLENOL) suppository 650 mg  650 mg Rectal Q4H PRN    0.9% sodium chloride infusion  75 mL/hr IntraVENous CONTINUOUS    ondansetron (ZOFRAN) injection 4 mg  4 mg IntraVENous Q6H PRN    aspirin chewable tablet 81 mg  81 mg Oral DAILY    pravastatin (PRAVACHOL) tablet 40 mg  40 mg Oral QHS    influenza vaccine 2017-18 (3 yrs+)(PF) (FLUZONE QUAD/FLUARIX QUAD) injection 0.5 mL  0.5 mL IntraMUSCular PRIOR TO DISCHARGE    buPROPion SR (WELLBUTRIN SR) tablet 400 mg  400 mg Oral DAILY     No Known Allergies    Visit Vitals    /67 (BP 1 Location: Left arm, BP Patient Position: At rest)    Pulse 63    Temp 98.2 °F (36.8 °C)    Resp 15    Ht 5' 8\" (1.727 m)    Wt 63.5 kg (140 lb)    SpO2 97%    BMI 21.29 kg/m2     Physical Exam   Constitutional: She is oriented to person, place, and time. She appears well-developed and well-nourished. Cardiovascular: Normal rate. Pulmonary/Chest: Effort normal.   Neurological: She is oriented to person, place, and time.  She has normal strength. She has a normal Finger-Nose-Finger Test and a normal Romberg Test. Gait normal.   Skin: Skin is warm and dry. Psychiatric: She has a normal mood and affect. Her behavior is normal.   Vitals reviewed. Neurologic Exam     Mental Status   Oriented to person, place, and time. Cranial Nerves   Cranial nerves II through XII intact. Motor Exam   Muscle bulk: normal    Strength   Strength 5/5 throughout. Sensory Exam   Light touch normal.     Gait, Coordination, and Reflexes     Gait  Gait: normal    Coordination   Romberg: negative  Finger to nose coordination: normal           Lab Results  Component Value Date/Time   WBC 4.6 11/18/2017 12:20 AM   HGB 13.5 11/18/2017 12:20 AM   HCT 39.8 11/18/2017 12:20 AM   PLATELET 149 86/82/7708 12:20 AM   MCV 96.6 11/18/2017 12:20 AM     Lab Results  Component Value Date/Time   Hemoglobin A1c 5.3 11/18/2017 12:20 AM   Glucose 94 11/17/2017 07:22 PM   Glucose (POC) 69 11/18/2017 12:34 PM   LDL, calculated 75.8 11/18/2017 12:20 AM   Creatinine 0.94 11/17/2017 07:22 PM      Lab Results  Component Value Date/Time   Cholesterol, total 182 11/18/2017 12:20 AM   HDL Cholesterol 99 11/18/2017 12:20 AM   LDL, calculated 75.8 11/18/2017 12:20 AM   Triglyceride 36 11/18/2017 12:20 AM   CHOL/HDL Ratio 1.8 11/18/2017 12:20 AM   Lab Results  Component Value Date/Time   ALT (SGPT) 39 11/17/2017 07:22 PM   AST (SGOT) 21 11/17/2017 07:22 PM   Alk.  phosphatase 61 11/17/2017 07:22 PM   Bilirubin, total 0.6 11/17/2017 07:22 PM   Albumin 4.1 11/17/2017 07:22 PM   Protein, total 7.5 11/17/2017 07:22 PM   INR 1.1 11/17/2017 07:22 PM   INR (POC) 1.0 11/17/2017 07:23 PM   Prothrombin time 11.2 11/17/2017 07:22 PM   PLATELET 491 55/33/8806 12:20 AM       Lab Results  Component Value Date/Time   TSH 3.06 11/18/2017 12:20 AM   Triiodothyronine (T3), free 2.6 05/10/2011 05:55 PM   T4, Free 1.13 05/10/2011 05:55 PM      Lab Results   Component Value Date/Time    Glucose 94 11/17/2017 07:22 PM    Glucose (POC) 69 11/18/2017 12:34 PM                       CT Results (maximum last 3): Results from [place] encounter on 11/17/17   CTA HEAD   Narrative *PRELIMINARY REPORT*  No emergent process. Preliminary report was provided by Dr. Negro Omalley  Final report to follow. *END PRELIMINARY REPORT*  CLINICAL HISTORY: Fullness behind the left eye with bilateral cervical neck pain    EXAMINATION:  CT ANGIOGRAPHY HEAD AND NECK     COMPARISON: CT head 11/17/2017    TECHNIQUE:  Following the uneventful administration of iodinated contrast  material, axial CT angiography of the head and neck was performed. Delayed axial  images through the head were also obtained. Coronal and sagittal reconstructions  were obtained. Manual postprocessing of images was performed. 3-D  Sagittal  maximal intensity projection images were obtained. 3-D Coronal maximal  intensity projections were obtained. CT dose reduction was achieved through use  of a standardized protocol tailored for this examination and automatic exposure  control for dose modulation. FINDINGS:    Delayed contrast-enhanced head CT:    The ventricles are midline without hydrocephalus. There is no acute intra or  extra-axial hemorrhage. Gray-white matter differentiation is preserved. Unremarkable appearance of the orbits. The basal cisterns are clear. The  paranasal sinuses are clear. CTA NECK:    Great vessels: Patent with aberrant retroesophageal right subclavian artery    Right subclavian artery: Patent with apparent retroesophageal origin    Left subclavian artery: Patent    Right common carotid artery: Patent    Left common carotid artery: Patent    Cervical right internal carotid artery: Patent  with no significant stenosis by  NASCET criteria. Cervical left internal carotid artery: Patent  with no significant stenosis by  NASCET criteria.     Right vertebral artery: Patent    Left vertebral artery: Patent    Moderate C5-6 degenerative disc disease    CTA HEAD:    Right cavernous internal carotid artery: Patent    Left cavernous internal carotid artery: Patent    Anterior cerebral arteries: Patent with hypoplastic right A1 segment    Anterior communicating artery: Patent with probable fenestration    Right middle cerebral artery: Patent    Left middle cerebral artery: Patent    Posterior communicating arteries: Left is hypoplastic. Right is patent    Posterior cerebral arteries: Patent with right fetal origin    Basilar artery: Patent    Distal vertebral arteries: Patent         Impression Impression:    No evidence for intracranial aneurysm or significant stenosis. Incidentally noted aberrant right subclavian artery with retroesophageal course                CTA NECK   Narrative *PRELIMINARY REPORT*  No emergent process. Preliminary report was provided by Dr. Trey Mahajan  Final report to follow. *END PRELIMINARY REPORT*    CLINICAL HISTORY: Fullness behind the left eye with bilateral cervical neck pain    EXAMINATION:  CT ANGIOGRAPHY HEAD AND NECK     COMPARISON: CT head 11/17/2017    TECHNIQUE:  Following the uneventful administration of iodinated contrast  material, axial CT angiography of the head and neck was performed. Delayed axial  images through the head were also obtained. Coronal and sagittal reconstructions  were obtained. Manual postprocessing of images was performed. 3-D  Sagittal  maximal intensity projection images were obtained. 3-D Coronal maximal  intensity projections were obtained. CT dose reduction was achieved through use  of a standardized protocol tailored for this examination and automatic exposure  control for dose modulation. FINDINGS:    Delayed contrast-enhanced head CT:    The ventricles are midline without hydrocephalus. There is no acute intra or  extra-axial hemorrhage. Gray-white matter differentiation is preserved. Unremarkable appearance of the orbits. The basal cisterns are clear. The  paranasal sinuses are clear. CTA NECK:    Great vessels: Patent with aberrant retroesophageal right subclavian artery    Right subclavian artery: Patent with apparent retroesophageal origin    Left subclavian artery: Patent    Right common carotid artery: Patent    Left common carotid artery: Patent    Cervical right internal carotid artery: Patent  with no significant stenosis by  NASCET criteria. Cervical left internal carotid artery: Patent  with no significant stenosis by  NASCET criteria. Right vertebral artery: Patent    Left vertebral artery: Patent    Moderate C5-6 degenerative disc disease    CTA HEAD:    Right cavernous internal carotid artery: Patent    Left cavernous internal carotid artery: Patent    Anterior cerebral arteries: Patent with hypoplastic right A1 segment    Anterior communicating artery: Patent with probable fenestration    Right middle cerebral artery: Patent    Left middle cerebral artery: Patent    Posterior communicating arteries: Left is hypoplastic. Right is patent    Posterior cerebral arteries: Patent with right fetal origin    Basilar artery: Patent    Distal vertebral arteries: Patent         Impression Impression:    No evidence for intracranial aneurysm or significant stenosis. Incidentally noted aberrant right subclavian artery with retroesophageal course                          Assessment and Plan        81-year-old woman who had sudden onset vertiginous symptoms concerning for possible TIA of the posterior circulation. Awaiting MRI results. CTA is negative for occlusive disease. She should be on aspirin 81 mg from here on out. Consider low-dose statin of approximately 10 mg. Continue stroke workup to include TTE and telemetry for 24-48 hours. I will follow peripherally. Please call with any questions. During this evaluation, we also discussed stroke education to include signs and symptoms of stroke and TIA.        Debbie Taylor, DO  NEUROLOGIST  Diplomate ABPN  11/18/2017

## 2017-11-18 NOTE — ED PROVIDER NOTES
HPI Comments: 47 y.o. female with past medical history significant for paroxysmal atrial flutter, depression, and menopause who presents via EMS with chief complaint of headache. Patient reports feeling \"like (she) was going to trip, like (her) knees were buckling\" with onset at ~1130 \"this morning. \" Patient reports having a \"fogginess behind (her) left eye,\" lightheadedness, and neck pain with onset \"earlier today. 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 \" Patient reports checking her blood pressure at a [facility] \"this evening,\" reports it was \"158/90. \" Patient reports being seen at [facility] for her symptoms; reports being referred to the ED for further evaluation. Patient denies double vision, weakness, numbness, and headache. There are no other acute medical concerns at this time. PCP: Clarissa Dias NP    Note written by Carlton Hyman, as dictated by Sharifa Short MD 7:01 PM     The history is provided by the patient.         Past Medical History:   Diagnosis Date    Depression 12/08    Menopause 2006    Paroxysmal atrial flutter [facility])     resolved with ablation 2/2010 dr orellana       Past Surgical History:   Procedure Laterality Date    ABLATION [facility] ARRHYTHMOGENIC FOCUS  2/15/2010    HX BREAST BIOPSY Left 2009    Surgical    HX COLONOSCOPY      2005-normal.     HX TOTAL ABDOMINAL HYSTERECTOMY  9/06         Family History:   Problem Relation Age of Onset    Cancer Father      small cell lung    Heart Disease Father 71     in 45s, smoker    Heart Attack Father      quad bypass     Gout Father     Diabetes Father      ??/mild (later onset)     Alzheimer Maternal Grandmother     Cancer Paternal Grandfather      leukemia     Heart Disease Paternal Grandmother      older age related chf     Migraines Sister        Social History     Social History    Marital status:      Spouse name: N/A    Number of children: N/A    Years of education: N/A     Occupational History    Not on file. Social History Main Topics    Smoking status: Never Smoker    Smokeless tobacco: Never Used    Alcohol use 1.5 oz/week     3 drink(s) per week      Comment: maybe 2-3 glasses wine/week    Drug use: Not on file    Sexual activity: Not on file     Other Topics Concern    Not on file     Social History Narrative         ALLERGIES: Review of patient's allergies indicates no known allergies. Review of Systems   Eyes: Positive for visual disturbance. Neurological: Positive for light-headedness. Negative for weakness, numbness and headaches. All other systems reviewed and are negative. Vitals:    11/17/17 1915 11/17/17 1916 11/17/17 1920   BP: 139/82 133/77 133/77   Pulse: 77 75 77   Resp: 13 14 13   Temp:  98.2 °F (36.8 °C)    SpO2: 100% 100% 100%   Weight:  63.5 kg (140 lb)    Height:  5' 8\" (1.727 m)             Physical Exam     Nursing note and vitals reviewed. Constitutional: appears well-developed and well-nourished. No distress. HENT:   Head: Normocephalic and atraumatic. Sclera anicteric  Nose: No rhinorrhea  Mouth/Throat: Oropharynx is clear and moist. Pharynx normal  Eyes: Conjunctivae are normal. Pupils are equal, round, and reactive to light. Right eye exhibits no discharge. Left eye exhibits no discharge. No scleral icterus. Neck: Painless normal range of motion. Supple  Cardiovascular: Normal rate, regular rhythm, normal heart sounds and intact distal pulses. Exam reveals no gallop and no friction rub. No murmur heard. Pulmonary/Chest: Effort normal and breath sounds normal. No respiratory distress. no wheezes. no rales. Abdominal: Soft. Bowel sounds are normal. Exhibits no distension and no mass. No tenderness. No guarding. Musculoskeletal: Normal range of motion. no tenderness. No edema  Lymphadenopathy:   No cervical adenopathy. Neurological:  Alert and oriented to person, place, and time. Coordination normal. CN 2-12 intact. Moving all extremities. NO ARM DRIFT. SYMMETRIC SMILE. FINGER TO NOSE INTACT. NORMAL SPEECH. INTACT LIGHT TOUCH SENSATION. Skin: Skin is warm and dry. No rash noted. No pallor. [facility]  ED Course   60-year-old female here with fullness behind her left eye, lightheadedness, neck pain. CODE stroke called by EMS prior to arrival. NIH of zero. Differential diagnosis includes intracranial mass, vertebral dissection, intracranial hemorrhage, CVA and others. We'll check CT head, labs. Procedures    CONSULT NOTE:  7:15 PM Dino Baptiste MD spoke with Dr. Jacqueline Reese, Consult for Tele-Neurology. Discussed available diagnostic tests and clinical findings. Provider is in agreement with care plans as outlined. Dr. Jacqueline Reese recommends getting a CTA of the head and neck to rule out a dissection and an MRI. Recommends admission. ED EKG interpretation:  Rhythm: normal sinus rhythm; and regular . Rate (approx.): 79 bpm; Axis: normal; ST/T wave: no acute ST or T wave changes; normal intervals. Note written by Carlton Key, as dictated by Dino Baptiste MD 7:26 PM    CONSULT NOTE:  9:32 Albert Amato MD spoke with Dr. Rosio Schmitt, Consult for Cardiology. Discussed available diagnostic tests and clinical findings. Provider is in agreement with care plans as outlined. Dr. Rosio Schmitt will see and admit the patient.

## 2017-11-18 NOTE — PROGRESS NOTES
Stroke Education documented in Patient Education: YES  Core Measures Documented in [facility]:  Risk Factors: YES  Warning signs of stroke: YES  When to Activate 911: YES  Medication Education for Risk Factors: YES  Smoking cessation if applicable: YES  Written Education Given:  YES    Discharge NIH Completed: YES  Score: 0    BRAINS: NO    Follow Up Appointment Made: NO  Date/Time if applicable: Follow up with nurse practitioner in one week  I have reviewed discharge instructions with the patient. The patient verbalized understanding. Patient being discharged to home; Belongings with patient, and vital signs within normal limits.

## 2017-11-18 NOTE — PROGRESS NOTES
Problem: Mobility Impaired (Adult and Pediatric)  Goal: *Acute Goals and Plan of Care (Insert Text)  physical Therapy neuro EVALUATION/discharge     Patient: Stephanie Alves (60 y.o. female)  Date: 11/18/2017  Primary Diagnosis: TIA (transient ischemic attack)        Precautions:        ASSESSMENT :  Based on the objective data described below, the patient presents with good strength, balance, and coordination with no c/o dizziness following admission for TIA. CT negative, MRI pending. PTA, pt reports that she lived at home with family and was indep with all ADLs and mobility w/o use of AD. She currently presents at baseline functional level and was able to complete all mobility at an overall SUP/Mod I level, including ambulating 400ft in hallway with no LOB or deviation noted. No focal neuro deficit appreciated on exam. Provided education on BE FAST. No further acute care PT indicated at this time, will sign off and complete order. Skilled physical therapy is not indicated at this time. PLAN :  Discharge Recommendations: None  Further Equipment Recommendations for Discharge: None       SUBJECTIVE:   Patient stated I have have pressure behind my left eye.     OBJECTIVE DATA SUMMARY:   HISTORY:    Past Medical History:   Diagnosis Date    Depression 12/08    Menopause 2006    Paroxysmal atrial flutter [facility])     resolved with ablation 2/2010 dr orellana     Past Surgical History:   Procedure Laterality Date    ABLATION [facility] ARRHYTHMOGENIC FOCUS  2/15/2010    HX BREAST BIOPSY Left 2009    Surgical    HX COLONOSCOPY      2005-normal.     HX TOTAL ABDOMINAL HYSTERECTOMY  9/06     Prior Level of Function/Home Situation: Lives at home with family and was indep with all ADLs and mobility w/o use of AD.    Personal factors and/or comorbidities impacting plan of care:     Home Situation  Home Environment: Private residence  # Steps to Enter: 5  Rails to Enter: Yes  One/Two Story Residence: Two story  # of Interior Steps: 14  Living Alone: No  Support Systems: Family member(s), Spouse/Significant Other/Partner  Patient Expects to be Discharged to[de-identified] Private residence  Current DME Used/Available at Home: None    EXAMINATION/PRESENTATION/DECISION MAKING:   Critical Behavior:  Neurologic State: Alert  Orientation Level: Oriented X4  Cognition: Follows commands, Appropriate decision making, Appropriate for age attention/concentration  Safety/Judgement: Awareness of environment, Fall prevention  Hearing: Auditory  Auditory Impairment: None  Skin:  Intact where exposed  Edema: none noted  Range Of Motion:  AROM: Within functional limits   PROM: Within functional limits        Strength:    Strength:  Within functional limits     Tone & Sensation:   Tone: Normal     Sensation: Intact      Coordination:  Coordination: Within functional limits  Vision:   Tracking: Able to track stimulus in all quadrants w/o difficulty  Diplopia: No  Functional Mobility:  Bed Mobility:     Supine to Sit: Independent      Transfers:  Sit to Stand: Modified independent  Stand to Sit: Modified independent     Balance:   Sitting: Intact  Standing: Intact  Ambulation/Gait Training:  Distance (ft): 400 Feet (ft)  Assistive Device: Gait belt  Ambulation - Level of Assistance: Modified independent  Gait Description (WDL): Exceptions to WDL    Functional Measure:  Love Balance Test:    Sitting to Standin  Standing Unsupported: 4  Sitting with Back Unsupported: 4  Standing to Sittin  Transfers: 4  Standing Unsupported with Eyes Closed: 4  Standing Unsupported with Feet Together: 4  Reach Forward with Outstretched Arm: 4   Object: 4  Turn to Look Over Shoulders: 3  Turn 360 Degrees: 4  Alternate Foot on Step/Stool: 4  Standing Unsupported One Foot in Front: 4  Stand on One Leg: 3  Total: 54         56=Maximum possible score;   0-20=High fall risk  21-40=Moderate fall risk   41-56=Low fall risk     Love Balance Test and G-code impairment scale:  Percentage of Impairment CH    0%   CI    1-19% CJ    20-39% CK    40-59% CL    60-79% CM    80-99% CN     100%   Love   Score 0-56 56 45-55 34-44 23-33 12-22 1-11 0     G codes: In compliance with CMSs Claims Based Outcome Reporting, the following G-code set was chosen for this patient based on their primary functional limitation being treated: The outcome measure chosen to determine the severity of the functional limitation was the Mckeon with a score of 54/56 which was correlated with the impairment scale. ? Mobility - Walking and Moving Around:     - CURRENT STATUS: CI - 1%-19% impaired, limited or restricted    - GOAL STATUS: CI - 1%-19% impaired, limited or restricted    - D/C STATUS:  CI - 1%-19% impaired, limited or restricted      Physical Therapy Evaluation Charge Determination   History Examination Presentation Decision-Making   MEDIUM  Complexity : 1-2 comorbidities / personal factors will impact the outcome/ POC  LOW Complexity : 1-2 Standardized tests and measures addressing body structure, function, activity limitation and / or participation in recreation  LOW Complexity : Stable, uncomplicated  LOW Complexity : FOTO score of       Based on the above components, the patient evaluation is determined to be of the following complexity level: LOW     Pain:  Pain Scale 1: Numeric (0 - 10)  Pain Intensity 1: 0        Activity Tolerance:   VSS, NAD  Please refer to the flowsheet for vital signs taken during this treatment. After treatment:   [x]         Patient left in no apparent distress sitting up in chair  []         Patient left in no apparent distress in bed  [x]         Call bell left within reach  [x]         Nursing notified  []         Caregiver present  []         Bed alarm activated    COMMUNICATION/EDUCATION:   Patient was educated regarding Her deficit(s) of none noted as this relates to Her diagnosis of TIA.   She demonstrated Good understanding as evidenced by nodding. Patient and/or family was verbally educated on the BE FAST acronym for signs/symptoms of CVA and TIA. BE FAST was written on patient's communication board  for visual education and reinforcement. All questions answered with patient indicating good understanding. [x]   Fall prevention education was provided and the patient/caregiver indicated understanding. [x]   Patient/family have participated as able and agree with findings and recommendations. []   Patient is unable to participate in plan of care at this time.     Findings and recommendations were discussed with: Registered Nurse    Thank you for this referral.  Rios Knowles, PT, DPT   Time Calculation: 17 mins

## 2017-11-18 NOTE — DISCHARGE SUMMARY
Discharge Summary     Patient:  Tootie Blanchard       MRN: [id number]       YOB: 1965       Age: 47 y.o. Date of admission:  11/17/2017    Date of discharge:  11/18/2017    Primary care provider: Dr. Toño Rodriguez NP    Admitting provider:  Candelario Bruno MD    Discharging provider:  [address] [phone]. If unavailable, call [phone] and ask the  to page the triage hospitalist.    Consultations  · IP CONSULT TO HOSPITALIST  · IP CONSULT TO NEUROLOGY    Procedures  · * No surgery found *    Discharge destination: Home. The patient is stable for discharge. Admission diagnosis  · TIA (transient ischemic attack)    Current Discharge Medication List      START taking these medications    Details   aspirin 81 mg chewable tablet Take 1 Tab by mouth daily. Can be obtained over-the-counter  Qty: 100 Tab, Refills: 0         CONTINUE these medications which have NOT CHANGED    Details   fexofenadine-pseudoephedrine (ALLEGRA-D 24 HOUR) 180-240 mg per tablet Take 1 Tab by mouth daily. raNITIdine (ZANTAC) 150 mg tablet Take 150 mg by mouth five (5) days a week. Indications: pt takes 150 mg in the morning 5/7 days of the week      buPROPion SR (WELLBUTRIN, ZYBAN) 200 mg SR tablet TAKE 2 TABLETS DAILY FOR ANXIETY WITH DEPRESSION  Qty: 180 Tab, Refills: 1      ALPRAZolam (XANAX) 0.25 mg tablet TAKE 1/2 TO 1 TABLET BY MOUTH TWICE A DAY AS NEEDED FOR ANXIETY  Qty: 60 Tab, Refills: 1    Comments: This request is for a new prescription for a controlled substance as required by Federal/State law. .      naproxen sodium (ALEVE) 220 mg tablet Take 220 mg by mouth two (2) times daily as needed. STOP taking these medications       famotidine (PEPCID) 40 mg tablet Comments:   Reason for Stopping:                Follow-up Information     Follow up With Details Comments [address] NP Schedule an appointment as soon as possible for a visit in 1 week For follow up after hospitalization [address]  [phone]            Final discharge diagnoses and brief hospital course  Please also refer to the admission H&P for details on the presenting problem. The patient is a 23-year-old female with past medical history of paroxysmal atrial flutter, status post ablation, depression, who presents to the hospital with balance problems. Balance problems with hypertension (POA) - panic attack vs TIA   - CT head 11/17 without acute process  - CTA head/neck 11/17 without stenosis or aneurysm, incidentally noted aberrant right subclavian artery with retroesophageal course  - MRI brain 11/18 without acute abnormality  - Echo 11/18 with LVEF 60-65%, mild MR.  - Continue aspirin, LDL 75, seems to be little role for statin, discussed with patient and will not prescribe  - Continue telemetry  - PT/OT  - Neurology consulted     Elevated blood pressure without a diagnosis of hypertension - I suspect that the high blood pressure was anxiety driven, it has resolved without intervention     Aflutter status-post ablation (chronic) - in sinus rhythm     Anxiety/depression  - Continue bupropion, xanax    FOLLOW-UP CARE RECOMMENDATIONS:     SYMPTOMS to watch for: dizziness, numbness, change in mentation, falling, weakness. DIET/what to eat:  Cardiac Diet    ACTIVITY:  Activity as tolerated    What to do if new or unexpected symptoms occur? If you experience any of the above symptoms (or should other concerns or questions arise after discharge) please call your primary care physician. Return to the emergency room if you cannot get hold of your doctor. · It is very important that you keep your follow-up appointment(s).   · Please bring discharge papers, medication list (and/or medication bottles) to your follow-up appointments for review by your outpatient provider(s). · Please check the list of medications and be sure it includes every medication (even non-prescription medications) that your provider wants you to take. · It is important that you take the medication exactly as they are prescribed. · Keep your medication in the bottles provided by the pharmacist and keep a list of the medication names, dosages, and times to be taken in your wallet. · Do not take other medications without consulting your doctor. · If you have any questions about your medications or other instructions, please talk to your nurse or care provider before you leave the hospital.    Physical examination at discharge  Visit Vitals    /62 (BP 1 Location: Left arm, BP Patient Position: At rest)    Pulse 73    Temp 98.4 °F (36.9 °C)    Resp 16    Ht 5' 8\" (1.727 m)    Wt 63.5 kg (140 lb)    SpO2 97%    BMI 21.29 kg/m2       Gen - NAD  HEENT - MMM  Neck - supple, full ROM  CV - RRR, no MRG  Resp - lungs CTA b/l, no wheezing, normal WOB  GI - abdomen S, NT, ND, +BS. No hepatosplenomegaly   - no CVA tenderness, bladder non-palpable in lower abdomen  MSK - normal tone and bulk, no edema  Neuro - A&O, no focal deficits  Psych - calm and cooperative with normal affect    Pertinent imaging studies:    CT head 11/17/17  FINDINGS:  The ventricles and sulci are normal in size, shape and configuration and  midline. There is no significant white matter disease. There is no intracranial  hemorrhage, extra-axial collection, mass, mass effect or midline shift. The  basilar cisterns are open. Calcifications in the basal ganglia. No acute infarct  is identified. The bone windows demonstrate no abnormalities. The visualized  portions of the paranasal sinuses and mastoid air cells are clear. IMPRESSION:   1. No evidence of acute intracranial abnormality by this modality.     CTA head/neck 11/17/17  CTA NECK:  Great vessels: Patent with aberrant retroesophageal right subclavian artery  Right subclavian artery: Patent with apparent retroesophageal origin  Left subclavian artery: Patent  Right common carotid artery: Patent  Left common carotid artery: Patent  Cervical right internal carotid artery: Patent  with no significant stenosis by  NASCET criteria. Cervical left internal carotid artery: Patent  with no significant stenosis by  NASCET criteria. Right vertebral artery: Patent  Left vertebral artery: Patent  Moderate C5-6 degenerative disc disease  CTA HEAD:  Right cavernous internal carotid artery: Patent  Left cavernous internal carotid artery: Patent  Anterior cerebral arteries: Patent with hypoplastic right A1 segment  Anterior communicating artery: Patent with probable fenestration  Right middle cerebral artery: Patent  Left middle cerebral artery: Patent  Posterior communicating arteries: Left is hypoplastic. Right is patent  Posterior cerebral arteries: Patent with right fetal origin  Basilar artery: Patent  Distal vertebral arteries: Patent  Impression:  No evidence for intracranial aneurysm or significant stenosis.   Incidentally noted aberrant right subclavian artery with retroesophageal course    MRI brain 11/18/17  FINDINGS: The ventricular size and configuration are normal. There are no areas  of abnormal signal in the cerebral hemispheres, brainstem or cerebellum. There  is no evidence of mass, hemorrhage, acute infarct or abnormal extra-axial fluid  collection. Normal appearing flow-voids are present in the vertebral, basilar  and carotid artery systems. The craniocervical junction is normal. The  structures at the cranial base including paranasal sinuses and mastoid air cells  are unremarkable. IMPRESSION: No acute abnormality. Echocardiogram 11/18/17  Left ventricular systolic function and ejection fraction are normal. Left   ventricular size is normal. The estimated left ventricular ejection   fraction is 60% to 65%. There is no significant LVH.  Wall thickness appears to be normal. Left atrial, right atrial, and right ventricular sizes   are normal. Right ventricular function is normal.  The aortic valve was structurally normal, without stenosis or   regurgitation. The mitral valve was structurally normal, without   stenosis. There is mild regurgitation. There is no prolapse. Tricuspid   valve was structurally normal, with no stenosis or significant   regurgitation. The pulmonic valve was not well seen. The aortic root size, IVC, and pericardium are normal.   CONCLUSION   1. Normal left ventricular systolic function. 2. Mild mitral insufficiency. Recent Labs      11/18/17   0020  11/17/17 1922   WBC  4.6  4.9   HGB  13.5  13.7   HCT  39.8  40.9   PLT  209  221     Recent Labs      11/17/17 1922   NA  140   K  3.7   CL  104   CO2  29   BUN  15   CREA  0.94   GLU  94   CA  9.5     Recent Labs      11/17/17 1922   SGOT  21   AP  61   TP  7.5   ALB  4.1   GLOB  3.4     Recent Labs      11/17/17 1923 11/17/17 1922   INR  1.0  1.1   PTP   --   11.2*   APTT   --   28.0      No results for input(s): FE, TIBC, PSAT, FERR in the last 72 hours. No results for input(s): PH, PCO2, PO2 in the last 72 hours. No results for input(s): CPK, CKMB in the last 72 hours.     No lab exists for component: TROPONINI  No components found for: [place]    Chronic Diagnoses:    Problem List as of 11/18/2017  Date Reviewed: 11/17/2017          Codes Class Noted - Resolved    * (Principal)Vertebrobasilar artery syndrome ICD-10-CM: G45.0  ICD-9-CM: 435.3  11/17/2017 - Present        Trigger point of neck ICD-10-CM: M54.2  ICD-9-CM: 723.1  6/27/2014 - Present        Cervicalgia ICD-10-CM: M54.2  ICD-9-CM: 723.1  6/27/2014 - Present        Tension headache ICD-10-CM: N09.116  ICD-9-CM: 307.81  6/27/2014 - Present        Depression ICD-10-CM: F32.9  ICD-9-CM: 687  Unknown - Present        RESOLVED: Paroxysmal atrial flutter ([address] ICD-10-CM: I48.92  ICD-9-CM: 427.32  5/9/2011 - 5/10/2011              Time spent on discharge related activities today greater than 30 minutes.       Signed:  Onofre Scott MD                 Hospitalist, Internal Medicine      Cc: Jacque Hilliard NP [employer]

## 2017-11-19 LAB
ATRIAL RATE: 79 BPM
CALCULATED P AXIS, ECG09: 81 DEGREES
CALCULATED R AXIS, ECG10: 37 DEGREES
CALCULATED T AXIS, ECG11: 62 DEGREES
DIAGNOSIS, 93000: NORMAL
P-R INTERVAL, ECG05: 142 MS
Q-T INTERVAL, ECG07: 392 MS
QRS DURATION, ECG06: 92 MS
QTC CALCULATION (BEZET), ECG08: 449 MS
VENTRICULAR RATE, ECG03: 79 BPM

## 2017-11-20 ENCOUNTER — PATIENT OUTREACH (OUTPATIENT)
Dept: FAMILY MEDICINE CLINIC | Age: 52
End: 2017-11-20

## 2017-11-20 NOTE — PROGRESS NOTES
Patient on [facility] report, admitted to [facility] 11/17 and discharged 11/18 to home. Presented to hospital with balance problems. Diagnosed with vertebrobasilar artery syndrome. Stroke work up- CT head without acute process, CTA head/neck without stenosis or aneurysm, MRI brain without acute abnormality,Echo with LVEF 60-65%,mild MR. Advised to see pcp within the week for f/u. Called patient today, LM requesting she call NN back on my direct number. 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 Says she feels fine today-at work. Has energy, no dizziness, no pressure behind eyes, no balance issues. Said she did need to f/u with pcp. Scheduled her to see NP [place] on 11/22 at 1:40pm. Marco Bridges her my number to call if any questions or concerns. Taking meds as ordered, did start the ASA 81mg qd as instructed. Discussed red flags-dizziness, sob, nausea,fever, balance issues, eye pain. Advised to notify MD if any develop. She thanked me for the call. Goals      Attends follow-up appointments as directed. 11/20/17 [facility] 11/17-11/18. Sched to see pcp on 11/22 at 1:40pm.mbt       Prevent complications post hospitalization. 11/20/17 Patient in [facility] 11/17-11/18. Sched for KEVIN f/u appt on 11/22 with pcp. Reviewed red flags. Gave her NN direct contact number to call if any new concerns or questions. mbt       Understands red flags post discharge. 11/20-reviewed red flags with patient.  Advised to report any such symptoms to [email or web address]

## 2017-11-22 ENCOUNTER — OFFICE VISIT (OUTPATIENT)
Dept: FAMILY MEDICINE CLINIC | Age: 52
End: 2017-11-22

## 2017-11-22 VITALS
TEMPERATURE: 96.9 F | HEIGHT: 67 IN | DIASTOLIC BLOOD PRESSURE: 82 MMHG | HEART RATE: 75 BPM | WEIGHT: 138.2 LBS | SYSTOLIC BLOOD PRESSURE: 118 MMHG | OXYGEN SATURATION: 100 % | BODY MASS INDEX: 21.69 KG/M2 | RESPIRATION RATE: 16 BRPM

## 2017-11-22 DIAGNOSIS — G45.0 VERTEBRAL ARTERY SYNDROME: Primary | ICD-10-CM

## 2017-11-22 DIAGNOSIS — H69.83 DYSFUNCTION OF BOTH EUSTACHIAN TUBES: ICD-10-CM

## 2017-11-22 DIAGNOSIS — I99.8 FLUCTUATING BLOOD PRESSURE: ICD-10-CM

## 2017-11-22 RX ORDER — MINERAL OIL
180 ENEMA (ML) RECTAL DAILY
Qty: 90 TAB | Refills: 5 | COMMUNITY
Start: 2017-11-22

## 2017-11-22 RX ORDER — ALPRAZOLAM 0.25 MG/1
TABLET ORAL
Qty: 60 TAB | Refills: 1 | Status: SHIPPED | OUTPATIENT
Start: 2017-11-22 | End: 2022-03-02

## 2017-11-22 NOTE — PROGRESS NOTES
HISTORY OF PRESENT ILLNESS  Arvin Stevens is a 47 y.o. female. HPI  Chief Complaint   Patient presents with    Transitions Of Care     Ms. Cecil Hdz is a 47y.o. year old female, she is seen today for Transition of Care services following a hospital discharge for vertebrobasilar artery syndrome on 11/18/17. Our office Nurse Navigator performed an outreach to Ms. Lemon Shoulders on 11/20/17 (within 2 business days of discharge) to complete medication reconciliation and a telephonic assessment of her condition. 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 Overall just felt like something was \"not right\" so [facility]. Bp was reportedly 160s/90s which it is usually normal. She was referred to [facility] where she had extensive work up. CT/MRA of head normal. Neck normal. Echo normal. Labs normal. She was told it was like a \"TIA\" and referred to neuro for fu. She has not yet connected with dr Archie Alexander. She remembers dr Archie Alexander said she didn't need to see her if the scans were normal (which they were). She also was seen by dr Mildred Hill. Would like to consider fu with him pending pcp recommendation. States her father did in his 45s of heart attack. Patient is otherwise healthy, non smoker, exercises regularly. Percieves some stress with work but overall has not been feeling more stressed than usual.   Has cuff at home so she can monitor bp if needed. Feeling light-headed occasionally, about 2-3 times per day and only lasts for a few minutes  States no one checked her ears when she was in the hospital but mentions that she does have a lot of ear and sinus issues, wonders if that could have caused her temporary loss of balance. She did see dr Silvana Cuevas who did skin allergy testing in august, has numerous food allergies, going 2 x week for allergy shots and taking allergra and zantac.    Review of Systems   Constitutional: Negative for chills, diaphoresis, fever, malaise/fatigue and weight loss. HENT: Negative for congestion, ear discharge, ear pain, hearing loss, nosebleeds, sore throat and tinnitus. Eyes: Negative for blurred vision, photophobia, pain, discharge and redness. Respiratory: Negative for cough, hemoptysis, sputum production, shortness of breath, wheezing and stridor. Cardiovascular: Negative for chest pain, palpitations, orthopnea and leg swelling. Gastrointestinal: Negative for abdominal pain, diarrhea, nausea and vomiting. Neurological: Negative for weakness and headaches. Tia-like episode   All other systems reviewed and are negative. Physical Exam   Constitutional: She is oriented to person, place, and time. She appears well-developed and well-nourished. No distress. HENT:   Head: Normocephalic and atraumatic. Right Ear: External ear normal. Tympanic membrane is retracted. Tympanic membrane is not bulging. No middle ear effusion. Left Ear: External ear normal. Tympanic membrane is retracted. Tympanic membrane is not bulging. No middle ear effusion. Nose: Nose normal. Right sinus exhibits no maxillary sinus tenderness and no frontal sinus tenderness. Left sinus exhibits no maxillary sinus tenderness and no frontal sinus tenderness. Mouth/Throat: Oropharynx is clear and moist. No oropharyngeal exudate. Eyes: Conjunctivae and EOM are normal. Pupils are equal, round, and reactive to light. Right eye exhibits no discharge. Left eye exhibits no discharge. No scleral icterus. Neck: Normal range of motion. Neck supple. No thyromegaly present. Cardiovascular: Normal rate, regular rhythm, normal heart sounds and intact distal pulses. Pulmonary/Chest: Effort normal and breath sounds normal. No stridor. No respiratory distress. She has no wheezes. She has no rales. She exhibits no tenderness. Abdominal: Bowel sounds are normal. She exhibits no distension and no mass. There is no tenderness. There is no rebound and no guarding. Musculoskeletal: She exhibits no tenderness. Lymphadenopathy:     She has no cervical adenopathy. Neurological: She is alert and oriented to person, place, and time. Skin: Skin is warm and dry. She is not diaphoretic. Nursing note and vitals reviewed. Results for orders placed or performed during the hospital encounter of 11/17/17   CBC WITH AUTOMATED DIFF   Result Value Ref Range    WBC 4.9 3.6 - 11.0 K/uL    RBC 4.22 3.80 - 5.20 M/uL    HGB 13.7 11.5 - 16.0 g/dL    HCT 40.9 35.0 - 47.0 %    MCV 96.9 80.0 - 99.0 FL    MCH 32.5 26.0 - 34.0 PG    MCHC 33.5 30.0 - 36.5 g/dL    RDW 12.2 11.5 - 14.5 %    PLATELET 863 254 - 015 K/uL    NEUTROPHILS 61 32 - 75 %    LYMPHOCYTES 31 12 - 49 %    MONOCYTES 7 5 - 13 %    EOSINOPHILS 1 0 - 7 %    BASOPHILS 0 0 - 1 %    ABS. NEUTROPHILS 3.0 1.8 - 8.0 K/UL    ABS. LYMPHOCYTES 1.5 0.8 - 3.5 K/UL    ABS. MONOCYTES 0.4 0.0 - 1.0 K/UL    ABS. EOSINOPHILS 0.1 0.0 - 0.4 K/UL    ABS. BASOPHILS 0.0 0.0 - 0.1 K/UL   METABOLIC PANEL, COMPREHENSIVE   Result Value Ref Range    Sodium 140 136 - 145 mmol/L    Potassium 3.7 3.5 - 5.1 mmol/L    Chloride 104 97 - 108 mmol/L    CO2 29 21 - 32 mmol/L    Anion gap 7 5 - 15 mmol/L    Glucose 94 65 - 100 mg/dL    BUN 15 6 - 20 MG/DL    Creatinine 0.94 0.55 - 1.02 MG/DL    BUN/Creatinine ratio 16 12 - 20      GFR est AA >60 >60 ml/min/1.73m2    GFR est non-AA >60 >60 ml/min/1.73m2    Calcium 9.5 8.5 - 10.1 MG/DL    Bilirubin, total 0.6 0.2 - 1.0 MG/DL    ALT (SGPT) 39 12 - 78 U/L    AST (SGOT) 21 15 - 37 U/L    Alk.  phosphatase 61 45 - 117 U/L    Protein, total 7.5 6.4 - 8.2 g/dL    Albumin 4.1 3.5 - 5.0 g/dL    Globulin 3.4 2.0 - 4.0 g/dL    A-G Ratio 1.2 1.1 - 2.2     PROTHROMBIN TIME + INR   Result Value Ref Range    INR 1.1 0.9 - 1.1      Prothrombin time 11.2 (H) 9.0 - 11.1 sec   PTT   Result Value Ref Range    aPTT 28.0 22.1 - 32.5 sec    aPTT, therapeutic range     58.0 - 77.0 SECS   TROPONIN I Result Value Ref Range    Troponin-I, Qt. <0.04 <0.05 ng/mL   TSH 3RD GENERATION   Result Value Ref Range    TSH 3.06 0.36 - 3.74 uIU/mL   LIPID PANEL   Result Value Ref Range    LIPID PROFILE          Cholesterol, total 182 <200 MG/DL    Triglyceride 36 <150 MG/DL    HDL Cholesterol 99 MG/DL    LDL, calculated 75.8 0 - 100 MG/DL    VLDL, calculated 7.2 MG/DL    CHOL/HDL Ratio 1.8 0 - 5.0     HEMOGLOBIN A1C WITH EAG   Result Value Ref Range    Hemoglobin A1c 5.3 4.2 - 6.3 %    Est. average glucose 105 mg/dL   CBC W/O DIFF   Result Value Ref Range    WBC 4.6 3.6 - 11.0 K/uL    RBC 4.12 3.80 - 5.20 M/uL    HGB 13.5 11.5 - 16.0 g/dL    HCT 39.8 35.0 - 47.0 %    MCV 96.6 80.0 - 99.0 FL    MCH 32.8 26.0 - 34.0 PG    MCHC 33.9 30.0 - 36.5 g/dL    RDW 12.1 11.5 - 14.5 %    PLATELET 480 472 - 070 K/uL   TROPONIN I   Result Value Ref Range    Troponin-I, Qt. <0.04 <0.05 ng/mL   POC INR   Result Value Ref Range    INR (POC) 1.0 <1.2     GLUCOSE, POC   Result Value Ref Range    Glucose (POC) 73 65 - 100 mg/dL    Performed by [facility]    GLUCOSE, POC   Result Value Ref Range    Glucose (POC) 69 65 - 100 mg/dL    Performed by [facility]    GLUCOSE, POC   Result Value Ref Range    Glucose (POC) 92 65 - 100 mg/dL    Performed by [facility]    GLUCOSE, POC   Result Value Ref Range    Glucose (POC) 107 (H) 65 - 100 mg/dL    Performed by [facility]    EKG, 12 LEAD, INITIAL   Result Value Ref Range    Ventricular Rate 79 BPM    Atrial Rate 79 BPM    P-R Interval 142 ms    QRS Duration 92 ms    Q-T Interval 392 ms    QTC Calculation (Bezet) 449 ms    Calculated P Axis 81 degrees    Calculated R Axis 37 degrees    Calculated T Axis 62 degrees    Diagnosis       Normal sinus rhythm  When compared with ECG of 15-FEB-2010 12:27,  No significant change was found  Confirmed by Trinidad Arana MD. ([id number]) on 11/19/2017 10:57:35 PM       Result Information   Status Provider Status      Final result (Exam End: 11/18/2017  4:09 PM) Open    Study Result   EXAM:  MRI BRAIN WO CONT     INDICATION:  TIA, headache     COMPARISON: Head CT 11/17/2017.     CONTRAST:  None.     TECHNIQUE: Sagittal T1, axial FLAIR, T2,T1 and gradient echo images as well as  coronal T2 weighted images and axial diffusion weighted images of the head were  obtained.     FINDINGS: The ventricular size and configuration are normal. There are no areas  of abnormal signal in the cerebral hemispheres, brainstem or cerebellum. There  is no evidence of mass, hemorrhage, acute infarct or abnormal extra-axial fluid  collection. Normal appearing flow-voids are present in the vertebral, basilar  and carotid artery systems. The craniocervical junction is normal. The  structures at the cranial base including paranasal sinuses and mastoid air cells  are unremarkable.     IMPRESSION  IMPRESSION: No acute abnormality.         Results   CTA NECK (Accession [id number]) (Order [id number])   Allergies      Unspecified: Seasonale [Levonorgestrel-ethinyl Estrad]   Result Information   Status Provider Status      Final result (Exam End: 11/17/2017  8:22 PM) Open    Study Result   *PRELIMINARY REPORT*  No emergent process. Preliminary report was provided by Dr. Peewee Velázquez  Final report to follow. *END PRELIMINARY REPORT     CLINICAL HISTORY: Fullness behind the left eye with bilateral cervical neck pain     EXAMINATION:  CT ANGIOGRAPHY HEAD AND NECK      COMPARISON: CT head 11/17/2017     TECHNIQUE:  Following the uneventful administration of iodinated contrast  material, axial CT angiography of the head and neck was performed. Delayed axial  images through the head were also obtained. Coronal and sagittal reconstructions  were obtained. Manual postprocessing of images was performed. 3-D  Sagittal  maximal intensity projection images were obtained. 3-D Coronal maximal  intensity projections were obtained.   CT dose reduction was achieved through use  of a standardized protocol tailored for this examination and automatic exposure  control for dose modulation.      FINDINGS:     Delayed contrast-enhanced head CT:     The ventricles are midline without hydrocephalus. There is no acute intra or  extra-axial hemorrhage. Gray-white matter differentiation is preserved. Unremarkable appearance of the orbits. The basal cisterns are clear. The  paranasal sinuses are clear.     CTA NECK:     Great vessels: Patent with aberrant retroesophageal right subclavian artery     Right subclavian artery: Patent with apparent retroesophageal origin     Left subclavian artery: Patent     Right common carotid artery: Patent     Left common carotid artery: Patent     Cervical right internal carotid artery: Patent  with no significant stenosis by  NASCET criteria.     Cervical left internal carotid artery: Patent  with no significant stenosis by  NASCET criteria.     Right vertebral artery: Patent     Left vertebral artery: Patent     Moderate C5-6 degenerative disc disease     CTA HEAD:     Right cavernous internal carotid artery: Patent     Left cavernous internal carotid artery: Patent     Anterior cerebral arteries: Patent with hypoplastic right A1 segment     Anterior communicating artery: Patent with probable fenestration     Right middle cerebral artery: Patent     Left middle cerebral artery: Patent     Posterior communicating arteries: Left is hypoplastic.  Right is patent     Posterior cerebral arteries: Patent with right fetal origin     Basilar artery: Patent     Distal vertebral arteries: Patent     IMPRESSION  Impression:     No evidence for intracranial aneurysm or significant stenosis.     Incidentally noted aberrant right subclavian artery with retroesophageal course      Transcription   Type ID Dictating Provider   Adult 2-D Echocardiogram [id number] Emily Sandy MD   Signed by Emily Sandy MD on 11/18/17 at [address] [facility]      []Hide copied text  []Hover for attribution information  [address] [facility] [address], [address]   [address]  Name:  Dawna Dennis   MR#:  [id number]   :  1965   Account #:  [[id number]]    Date of Procedure:  2017   Date of Adm:  2017         INDICATION FOR STUDY: TIA.     A 2-dimensional echocardiogram with color flow, M-mode, and Doppler   was performed. The acoustic window was of good quality.     Left ventricular systolic function and ejection fraction are normal. Left   ventricular size is normal. The estimated left ventricular ejection   fraction is 60% to 65%. There is no significant LVH. Wall thickness   appears to be normal. Left atrial, right atrial, and right ventricular sizes   are normal. Right ventricular function is normal.     The aortic valve was structurally normal, without stenosis or   regurgitation. The mitral valve was structurally normal, without   stenosis. There is mild regurgitation. There is no prolapse. Tricuspid   valve was structurally normal, with no stenosis or significant   regurgitation. The pulmonic valve was not well seen.     The aortic root size, IVC, and pericardium are normal.     CONCLUSION   1. Normal left ventricular systolic function. 2. Mild mitral insufficiency.             ASSESSMENT and PLAN    ICD-10-CM ICD-9-CM    1. Vertebral artery syndrome G45.0 435.1 REFERRAL TO CARDIOLOGY   2. Fluctuating blood pressure I99.8 796.4 REFERRAL TO CARDIOLOGY   3. Dysfunction of both eustachian tubes H69.83 381.81      Diagnoses and all orders for this visit:    1. Vertebral artery syndrome  2. Fluctuating blood pressure  -     REFERRAL TO CARDIOLOGY-will have her fu with dr HINOJOSA to discuss if he would recommend and post hosp testing or medication. I asked her in meantime to keep log of her symptoms and also check her bps if she is feeling light-headed or dizzy. Her baseline bp is normal but it seems to be fluctuating (cause unknown).  We will see her back as needed for this or any worsening symptoms     3. Dysfunction of both eustachian tubes  Possibly may have caused/contributerd to her recent episode? Will consider ent in future if episodes continue   Anxiety  - refilled prn, last rx lasted over 6 months, she is uses rarely for worse anxiety in evening or sleep    ALPRAZolam (XANAX) 0.25 mg tablet; TAKE 1/2 TO 1 TABLET BY MOUTH TWICE A DAY AS NEEDED FOR ANXIETY      Follow-up Disposition:  Return if symptoms worsen or fail to improve.

## 2017-11-22 NOTE — PROGRESS NOTES
Chief Complaint   Patient presents with    Transitions Of Care     1. Have you been to the ER, urgent care clinic since your last visit? Hospitalized since your last visit? Yes When: [facility]     2. Have you seen or consulted any other health care providers outside of the [address] [place] [address] since your last visit? Include any pap smears or colon screening.  No

## 2017-11-22 NOTE — MR AVS SNAPSHOT
Visit Information Date & Time Provider Department Dept. Phone Encounter #  
 11/22/2017  1:40 PM Aston Cantu, [address] [phone] [id number] Follow-up Instructions Return if symptoms worsen or fail to improve. Upcoming Health Maintenance Date Due FOBT Q 1 YEAR AGE 50-75 3/17/2016 BREAST CANCER SCRN MAMMOGRAM 8/4/2019 DTaP/Tdap/Td series (2 - Td) 7/20/2025 Allergies as of 11/22/2017  Review Complete On: 11/22/2017 By: Aston Cantu NP Severity Noted Reaction Type Reactions Seasonale [Levonorgestrel-ethinyl Estrad]  11/22/2017    Other (comments) Current Immunizations  Never Reviewed Name Date Influenza Vaccine (Quad) PF 11/18/2017  5:34 PM  
 Tdap 7/20/2015 Not reviewed this visit You Were Diagnosed With   
  
 Codes Comments Vertebral artery syndrome    -  Primary ICD-10-CM: G45.0 ICD-9-CM: 435.1 Fluctuating blood pressure     ICD-10-CM: I99.8 ICD-9-CM: 796.4 Dysfunction of both eustachian tubes     ICD-10-CM: O93.93 ICD-9-CM: 381.81 Vitals BP Pulse Temp Resp Height(growth percentile) Weight(growth percentile) 118/82 75 96.9 °F (36.1 °C) (Oral) 16 5' 7\" (1.702 m) 138 lb 3.2 oz (62.7 kg) SpO2 BMI OB Status Smoking Status 100% 21.65 kg/m2 Hysterectomy Never Smoker Vitals History BMI and BSA Data Body Mass Index Body Surface Area  
 21.65 kg/m 2 1.72 m 2 Preferred Pharmacy Pharmacy Name Phone [address] [facility] [phone] Your Updated Medication List  
  
   
This list is accurate as of: 11/22/17  2:47 PM.  Always use your most recent med list.  
  
  
  
  
 ALEVE 220 mg tablet Generic drug:  naproxen sodium Take 220 mg by mouth two (2) times daily as needed. ALPRAZolam 0.25 mg tablet Commonly known as:  Darcie Henley TAKE 1/2 TO 1 TABLET BY MOUTH TWICE A DAY AS NEEDED FOR ANXIETY  
  
 aspirin 81 mg chewable tablet Take 1 Tab by mouth daily. Can be obtained over-the-counter buPROPion  mg SR tablet Commonly known as:  FRANCESCO CARSON  
TAKE 2 TABLETS DAILY FOR ANXIETY WITH DEPRESSION  
  
 fexofenadine 180 mg tablet Commonly known as:  Nayan Leblanc Take 1 Tab by mouth daily. ZANTAC 150 mg tablet Generic drug:  raNITIdine Take 150 mg by mouth five (5) days a week. Indications: pt takes 150 mg in the morning 5/7 days of the week Prescriptions Printed Refills ALPRAZolam (XANAX) 0.25 mg tablet 1 Sig: TAKE 1/2 TO 1 TABLET BY MOUTH TWICE A DAY AS NEEDED FOR ANXIETY Class: Print We Performed the Following REFERRAL TO CARDIOLOGY [RLL55 Custom] Comments:  
 Hospital follow up from vertebral artery syndrome and blood pressure fluctuations. Make recommendations for medication management Follow-up Instructions Return if symptoms worsen or fail to improve. Referral Information Referral ID Referred By Referred To  
  
 [id number] Mattie West [address] Phone: [phone] Visits Status Start Date End Date 1 New Request 11/22/17 11/22/18 If your referral has a status of pending review or denied, additional information will be sent to support the outcome of this decision. Introducing [facility]! Dear Natalie Pallas: Thank you for requesting a [employer] account. Our records indicate that you already have an active [employer] account. You can access your account anytime at [email or web address] Did you know that you can access your hospital and ER discharge instructions at any time in [employer]? You can also review all of your test results from your hospital stay or ER visit. Additional Information If you have questions, please visit the Frequently Asked Questions section of the [employer] website at [email or web address] Remember, [employer] is NOT to be used for urgent needs. For medical emergencies, dial 911. Now available from your iPhone and Android! Please provide this summary of care documentation to your next provider. Your primary care clinician is listed as Summer Bolanos. If you have any questions after today's visit, please call [phone].

## 2017-12-11 ENCOUNTER — TELEPHONE (OUTPATIENT)
Dept: CARDIOLOGY CLINIC | Age: 52
End: 2017-12-11

## 2017-12-11 NOTE — TELEPHONE ENCOUNTER
Patient is calling you back, she said she cant come in earlier on the 14th because of prior engagments with her job, she said she would love if it can be the week of December 18th  Phone: [phone]

## 2017-12-21 ENCOUNTER — OFFICE VISIT (OUTPATIENT)
Dept: CARDIOLOGY CLINIC | Age: 52
End: 2017-12-21

## 2017-12-21 VITALS
BODY MASS INDEX: 22.13 KG/M2 | HEIGHT: 67 IN | SYSTOLIC BLOOD PRESSURE: 110 MMHG | HEART RATE: 60 BPM | DIASTOLIC BLOOD PRESSURE: 80 MMHG | WEIGHT: 141 LBS

## 2017-12-21 DIAGNOSIS — R42 DIZZINESS: ICD-10-CM

## 2017-12-21 DIAGNOSIS — I10 HYPERTENSION, UNSPECIFIED TYPE: Primary | ICD-10-CM

## 2017-12-21 DIAGNOSIS — Z98.890 S/P ABLATION OF ATRIAL FLUTTER: ICD-10-CM

## 2017-12-21 DIAGNOSIS — Z86.79 S/P ABLATION OF ATRIAL FLUTTER: ICD-10-CM

## 2017-12-21 NOTE — PROGRESS NOTES
Cardiac Electrophysiology Consultation Note     Subjective:      Nae Torres is a 47 y.o. female patient who is seen for evaluation of hypertension/TIA. 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 After sitting down she went to the nurses station and BP was 160/100. On her way home she decided to go to [facility] because she still did not \"feel right\". She c/o blurry fullness sensation behind left eye and /88 so she was sent to ED. Family hx: Father MI and CABG at age 60 yo,  from Small cell lung cancer. Mother hypertension   Social hx: No tobacco. 1-2 glasses of wine on the weekend. Exercise 3-4 days a week. . 2 children. 2/15/17 EP Study and atrial flutter ablation by Dr. Aneta Banuelos. At theThe ejection fraction is 45%. The  patient is symptomatic with dyspnea on exertion. Echo 2017 normal LVEF, mild MR      Patient Active Problem List    Diagnosis Date Noted    Vertebrobasilar artery syndrome 2017    Trigger point of neck 2014    Cervicalgia 2014    Tension headache 2014    Depression      Current Outpatient Prescriptions   Medication Sig Dispense Refill    fexofenadine (ALLEGRA) 180 mg tablet Take 1 Tab by mouth daily. 90 Tab 5    ALPRAZolam (XANAX) 0.25 mg tablet TAKE 1/2 TO 1 TABLET BY MOUTH TWICE A DAY AS NEEDED FOR ANXIETY 60 Tab 1    aspirin 81 mg chewable tablet Take 1 Tab by mouth daily. Can be obtained over-the-counter 100 Tab 0    raNITIdine (ZANTAC) 150 mg tablet Take 150 mg by mouth five (5) days a week. Indications: pt takes 150 mg in the morning 5/7 days of the week      buPROPion SR (WELLBUTRIN, ZYBAN) 200 mg SR tablet TAKE 2 TABLETS DAILY FOR ANXIETY WITH DEPRESSION 180 Tab 1    naproxen sodium (ALEVE) 220 mg tablet Take 220 mg by mouth two (2) times daily as needed.        Allergies   Allergen Reactions    Seasonale [Levonorgestrel-Ethinyl Estrad] Other (comments)     Past Medical History:   Diagnosis Date    Depression 12/08    Menopause 2006    Paroxysmal atrial flutter [facility])     resolved with ablation 2/2010 dr orellana    Stroke [facility])      Past Surgical History:   Procedure Laterality Date    ABLATION SUPRAVENT ARRHYTHMOGENIC FOCUS  2/15/2010    HX BREAST BIOPSY Left 2009    Surgical    HX COLONOSCOPY      2005-normal.     HX TOTAL ABDOMINAL HYSTERECTOMY  9/06     Family History   Problem Relation Age of Onset    Cancer Father      small cell lung    Heart Disease Father 71     in 45s, smoker    Heart Attack Father      quad bypass     Gout Father     Diabetes Father      ??/mild (later onset)     Alzheimer Maternal Grandmother     Cancer Paternal Grandfather      leukemia     Heart Disease Paternal Grandmother      older age related chf    [facility] Migraines Sister      Social History   Substance Use Topics    Smoking status: Never Smoker    Smokeless tobacco: Never Used    Alcohol use 1.5 oz/week     3 Standard drinks or equivalent per week      Comment: maybe 2-3 glasses wine/week        Review of Systems:   Constitutional: Negative for fever, chills, weight loss, malaise/fatigue. HEENT: Negative for nosebleeds, vision changes. Respiratory: Negative for cough, hemoptysis, sputum production, and wheezing. Cardiovascular: Negative for chest pain, palpitations, orthopnea, claudication, leg swelling, syncope, and PND. Gastrointestinal: Negative for nausea, vomiting, diarrhea, constipation, blood in stool and melena. Genitourinary: Negative for dysuria, and hematuria. Musculoskeletal: Negative for myalgias, arthralgia. Skin: Negative for rash. Heme: Does not bleed or bruise easily.    Neurological: Negative for speech change and focal weakness     Objective:     Visit Vitals    /80 (BP 1 Location: Left arm, BP Patient Position: Sitting)    Pulse 60    Ht 5' 7\" (1.702 m)    Wt 141 lb (64 kg)    BMI 22.08 kg/m2      Physical Exam:   Constitutional: well-developed and well-nourished. No distress. Head: Normocephalic and atraumatic. Eyes: Pupils are equal, round  Neck: supple. No JVD present. Cardiovascular: Normal rate, regular rhythm. Exam reveals no gallop and no friction rub. No murmur heard. Pulmonary/Chest: Effort normal and breath sounds normal. No wheezes. Abdominal: Soft, no tenderness. Musculoskeletal: no edema. Neurological: alert,oriented. Skin: Skin is warm and dry  Psychiatric: normal mood and affect. Behavior is normal. Judgment and thought content normal.      EKG:  Normal sinus rhythm Hr 79 BPM      Assessment/Plan:       ICD-10-CM ICD-9-CM    1. Hypertension, unspecified type I10 401.9    2. Near syncope R55 780.2    3. S/P ablation of atrial flutter Z98.890 V45.89     Z86.79     4. Transient cerebral ischemia, unspecified type G45.9 435.9      Recommend checking BP daily. Start baby ASA daily. Recommend loop monitor if she has starts to experience palpitations. Normal LVEF 11/2017. Thank you for involving me in this patient's care and please call with further concerns or questions. Rose Marie Grubbs M.D.   Electrophysiology/Cardiology  [facility]  [address] , [address], [address] [place] [address]  [phone]

## 2017-12-21 NOTE — MR AVS SNAPSHOT
Visit Information Date & Time Provider Department Dept. Phone Encounter #  
 12/21/2017  8:00 AM Dyana Esteban MD [facility] Shanda Wood [phone] [id number] Upcoming Health Maintenance Date Due FOBT Q 1 YEAR AGE 50-75 3/17/2016 DTaP/Tdap/Td series (2 - Td) 7/20/2025 Allergies as of 12/21/2017  Review Complete On: 12/21/2017 By: Dyana Esteban MD  
  
 Severity Noted Reaction Type Reactions Seasonale [Levonorgestrel-ethinyl Estrad]  11/22/2017    Other (comments) Current Immunizations  Never Reviewed Name Date Influenza Vaccine (Quad) PF 11/18/2017  5:34 PM  
 Tdap 7/20/2015 Not reviewed this visit You Were Diagnosed With   
  
 Codes Comments Hypertension, unspecified type    -  Primary ICD-10-CM: I10 
ICD-9-CM: 401.9 Near syncope     ICD-10-CM: R55 
ICD-9-CM: 780.2 S/P ablation of atrial flutter     ICD-10-CM: Z98.890, Z86.79 
ICD-9-CM: V45.89 Transient cerebral ischemia, unspecified type     ICD-10-CM: G45.9 ICD-9-CM: 435.9 Vitals BP Pulse Height(growth percentile) Weight(growth percentile) BMI OB Status 110/80 (BP 1 Location: Left arm, BP Patient Position: Sitting) 60 5' 7\" (1.702 m) 141 lb (64 kg) 22.08 kg/m2 Hysterectomy Smoking Status Never Smoker Vitals History BMI and BSA Data Body Mass Index Body Surface Area 22.08 kg/m 2 1.74 m 2 Preferred Pharmacy Pharmacy Name Phone [address] [facility] [phone] Your Updated Medication List  
  
   
This list is accurate as of: 12/21/17  8:44 AM.  Always use your most recent med list.  
  
  
  
  
 ALEVE 220 mg tablet Generic drug:  naproxen sodium Take 220 mg by mouth two (2) times daily as needed. ALPRAZolam 0.25 mg tablet Commonly known as:  XANAX  
TAKE 1/2 TO 1 TABLET BY MOUTH TWICE A DAY AS NEEDED FOR ANXIETY aspirin 81 mg chewable tablet Take 1 Tab by mouth daily. Can be obtained over-the-counter buPROPion  mg SR tablet Commonly known as:  FRANCESCO CARSON  
TAKE 2 TABLETS DAILY FOR ANXIETY WITH DEPRESSION  
  
 fexofenadine 180 mg tablet Commonly known as:  Smitha Frankel Take 1 Tab by mouth daily. ZANTAC 150 mg tablet Generic drug:  raNITIdine Take 150 mg by mouth five (5) days a week. Indications: pt takes 150 mg in the morning 5/7 days of the week Patient Instructions Please check your blood pressures at home You will need to follow up in clinic with Dr. Cornelious Felty as needed Introducing [facility] SERVICES! Dear Daisy Ricketts: Thank you for requesting a [employer] account. Our records indicate that you already have an active [employer] account. You can access your account anytime at [email or web address] [email or web address] Did you know that you can access your hospital and ER discharge instructions at any time in [employer]? You can also review all of your test results from your hospital stay or ER visit. Additional Information If you have questions, please visit the Frequently Asked Questions section of the [employer] website at [email or web address] Remember, [employer] is NOT to be used for urgent needs. For medical emergencies, dial 911. Now available from your iPhone and Android! Please provide this summary of care documentation to your next provider. Your primary care clinician is listed as Toño Rodriguez. If you have any questions after today's visit, please call [phone].

## 2017-12-21 NOTE — PATIENT INSTRUCTIONS
Please check your blood pressures at home    You will need to follow up in clinic with Dr. Cornelious Felty as needed

## 2017-12-21 NOTE — PROGRESS NOTES
Cardiac Electrophysiology Office Note     Subjective:      Jo Mackey is a 47 y.o. female patient who is seen for evaluation of hypertension/TIA. 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 After sitting down she went to the nurses station and BP was 160/100 mmHg. On her way home she decided to go to [facility] because she still did not \"feel right\". She c/o blurry fullness sensation behind left eye and /88 so she was sent to ED. Family hx: Father MI and CABG at age 60 yo,  from Small cell lung cancer. Mother hypertension   Social hx: No tobacco. 1-2 glasses of wine on the weekend. Exercise 3-4 days a week. . 2 children. Hx of  EP Study and atrial flutter ablation by me. At the time of procedure the ejection fraction is 45%. The patient is symptomatic with dyspnea on exertion. When in hospital ECG was normal and   Echo 2017 normal LVEF, mild MR      Patient Active Problem List    Diagnosis Date Noted    Vertebrobasilar artery syndrome 2017    Trigger point of neck 2014    Cervicalgia 2014    Tension headache 2014    Depression      Current Outpatient Prescriptions   Medication Sig Dispense Refill    fexofenadine (ALLEGRA) 180 mg tablet Take 1 Tab by mouth daily. 90 Tab 5    ALPRAZolam (XANAX) 0.25 mg tablet TAKE 1/2 TO 1 TABLET BY MOUTH TWICE A DAY AS NEEDED FOR ANXIETY 60 Tab 1    aspirin 81 mg chewable tablet Take 1 Tab by mouth daily. Can be obtained over-the-counter 100 Tab 0    raNITIdine (ZANTAC) 150 mg tablet Take 150 mg by mouth five (5) days a week.  Indications: pt takes 150 mg in the morning 5/7 days of the week      buPROPion SR (WELLBUTRIN, ZYBAN) 200 mg SR tablet TAKE 2 TABLETS DAILY FOR ANXIETY WITH DEPRESSION 180 Tab 1    naproxen sodium (ALEVE) 220 mg tablet Take 220 mg by mouth two (2) times daily as needed. Allergies   Allergen Reactions    Seasonale [Levonorgestrel-Ethinyl Estrad] Other (comments)     Past Medical History:   Diagnosis Date    Depression 12/08    Menopause 2006    Paroxysmal atrial flutter ([address]     resolved with ablation 2/2010 dr orellana    Stroke [facility])      Past Surgical History:   Procedure Laterality Date    ABLATION SUPRAVENT ARRHYTHMOGENIC FOCUS  2/15/2010    HX BREAST BIOPSY Left 2009    Surgical    HX COLONOSCOPY      2005-normal.     HX TOTAL ABDOMINAL HYSTERECTOMY  9/06     Family History   Problem Relation Age of Onset    Cancer Father      small cell lung    Heart Disease Father 71     in 45s, smoker    Heart Attack Father      quad bypass     Gout Father     Diabetes Father      ??/mild (later onset)     Alzheimer Maternal Grandmother     Cancer Paternal Grandfather      leukemia     Heart Disease Paternal Grandmother      older age related chf    [employer] Migraines Sister      Social History   Substance Use Topics    Smoking status: Never Smoker    Smokeless tobacco: Never Used    Alcohol use 1.5 oz/week     3 Standard drinks or equivalent per week      Comment: maybe 2-3 glasses wine/week        Review of Systems:   Constitutional: Negative for fever, chills, weight loss, malaise/fatigue. HEENT: Negative for nosebleeds, vision changes. Respiratory: Negative for cough, hemoptysis, sputum production, and wheezing. Cardiovascular: Negative for chest pain, palpitations, orthopnea, claudication, leg swelling, syncope, and PND. Gastrointestinal: Negative for nausea, vomiting, diarrhea, constipation, blood in stool and melena. Genitourinary: Negative for dysuria, and hematuria. Musculoskeletal: Negative for myalgias, arthralgia. Skin: Negative for rash. Heme: Does not bleed or bruise easily.    Neurological: Negative for speech change and focal weakness     Objective:     Visit Vitals    /80 (BP 1 Location: Left arm, BP Patient Position: Sitting)  Pulse 60    Ht 5' 7\" (1.702 m)    Wt 141 lb (64 kg)    BMI 22.08 kg/m2      Physical Exam:   Constitutional: well-developed and well-nourished. No distress. Head: Normocephalic and atraumatic. Eyes: Pupils are equal, round  Neck: supple. No JVD present. Cardiovascular: Normal rate, regular rhythm. Exam reveals no gallop and no friction rub. No murmur heard. Pulmonary/Chest: Effort normal and breath sounds normal. No wheezes. Abdominal: Soft, no tenderness. Musculoskeletal: no edema. Neurological: alert,oriented. Skin: Skin is warm and dry  Psychiatric: normal mood and affect. Behavior is normal. Judgment and thought content normal.      EKG:  Normal sinus rhythm      Assessment/Plan:       ICD-10-CM ICD-9-CM    1. Hypertension, unspecified type I10 401.9    2. Dizziness R42 780.4    3. S/P ablation of atrial flutter Z98.890 V45.89     Z86.79       Recommend checking BP daily. She already bought device and use at home  So far BP is normal  She did not use sudaphed or had cheese that day  Continue with baby ASA daily. Recommend loop monitor if she starts to experience palpitations. She will call me about this to see if she has PAF. Risk of stroke for now is gender female  Normal LVEF 11/2017. UGALDE upstairs mild so no stress test for now  Follow up PRN    Thank you for involving me in this patient's care and please call with further concerns or questions. Fanta Mandel M.D.   Electrophysiology/Cardiology  [facility] [place]  [address] [facility], [address]  [address]  [phone]

## 2018-06-26 ENCOUNTER — OFFICE VISIT (OUTPATIENT)
Dept: FAMILY MEDICINE CLINIC | Age: 53
End: 2018-06-26

## 2018-06-26 VITALS
RESPIRATION RATE: 18 BRPM | HEIGHT: 67 IN | TEMPERATURE: 98.3 F | SYSTOLIC BLOOD PRESSURE: 129 MMHG | WEIGHT: 133.4 LBS | DIASTOLIC BLOOD PRESSURE: 85 MMHG | HEART RATE: 68 BPM | OXYGEN SATURATION: 100 % | BODY MASS INDEX: 20.94 KG/M2

## 2018-06-26 DIAGNOSIS — J30.89 PERENNIAL ALLERGIC RHINITIS: ICD-10-CM

## 2018-06-26 DIAGNOSIS — M19.042 OSTEOARTHRITIS OF LEFT HAND, UNSPECIFIED OSTEOARTHRITIS TYPE: ICD-10-CM

## 2018-06-26 DIAGNOSIS — F41.8 SITUATIONAL ANXIETY: ICD-10-CM

## 2018-06-26 DIAGNOSIS — Z87.19 HISTORY OF GASTRITIS: ICD-10-CM

## 2018-06-26 DIAGNOSIS — F33.42 RECURRENT MAJOR DEPRESSIVE DISORDER, IN FULL REMISSION (HCC): Primary | ICD-10-CM

## 2018-06-26 DIAGNOSIS — M50.30 DDD (DEGENERATIVE DISC DISEASE), CERVICAL: ICD-10-CM

## 2018-06-26 DIAGNOSIS — F40.240 CLAUSTROPHOBIA: ICD-10-CM

## 2018-06-26 DIAGNOSIS — G47.9 SLEEP DISTURBANCE: ICD-10-CM

## 2018-06-26 DIAGNOSIS — B07.8 OTHER VIRAL WARTS: ICD-10-CM

## 2018-06-26 DIAGNOSIS — G45.0 VERTEBROBASILAR ARTERY SYNDROME: ICD-10-CM

## 2018-06-26 RX ORDER — BUPROPION HYDROCHLORIDE 200 MG/1
TABLET, EXTENDED RELEASE ORAL
Qty: 90 TAB | Refills: 1 | Status: SHIPPED | OUTPATIENT
Start: 2018-06-26 | End: 2018-10-01 | Stop reason: SDUPTHER

## 2018-06-26 NOTE — PROGRESS NOTES
Chief Complaint   Patient presents with   [address]     Patient here to establish care with provider today. 1. Have you been to the ER, urgent care clinic since your last visit? Hospitalized since your last visit? No    2. Have you seen or consulted any other health care providers outside of the [address] [place] [address] since your last visit? Include any pap smears or colon screening.  No

## 2018-06-26 NOTE — PROGRESS NOTES
[address] [facility] Note      Subjective:     Chief Complaint   Patient presents with   [facility] Establish Care     Patient here to establish care with provider today. Bassam Torres is a 47y.o. year old female who presents for evaluation of the following:    Establishment of Care:  Previous PCP[de-identified] NP [facility] Team:   Dentist- Dr. Melanie Ordoñez in past 1 year  GYN- Dr. Ale Vásquez  Cardiology: Dr. James Qiu  Allergist: Earl Allergy  GI: Dr. Nikki Raymundo      PMH:   TIA/ Vertebrobasilar Artery Syndrome:     Tx: ASA daily  11/2017   No recurrenceeof dizziness  Does not follow with neurology    Paroxysmal Atrial Flutter:  S/p ablation   Follow by cardiologist Dr. Natalya Fuentes current palpitations, chest pain, shortness of breath    Depression:   Chronic since 1997  Tx: Wellbutrin  Mood is good  STEVEN: 5  PHQ over the last two weeks 6/26/2018   Little interest or pleasure in doing things Several days   Feeling down, depressed or hopeless Not at all   Total Score PHQ 2 1   Trouble falling or staying asleep, or sleeping too much More than half the days   Feeling tired or having little energy Several days   Poor appetite or overeating Not at all   Feeling bad about yourself - or that you are a failure or have let yourself or your family down Not at all   Trouble concentrating on things such as school, work, reading or watching TV Several days   Moving or speaking so slowly that other people could have noticed; or the opposite being so fidgety that others notice Not at all   Thoughts of being better off dead, or hurting yourself in some way Not at all   PHQ 9 Score 5   How difficult have these problems made it for you to do your work, take care of your home and get along with others Not difficult at all         Sleep Disturbance:   Hard to maintain sleep occasionally  Tx: melatonin and otc with relief    Perennial Allergies:   Alergy to everything bu spinach hand baked potatoes  Followed by an allergst  Tx: allergy shots weekly + allegra  Bloating has improved sicne starting allergy shots  Diet: trying to elimate gluten and dairy    DDD Cervical Spine/Tension headaches  214 XR  Degenerative disc disease with disc osteophyte complex at C5/C6. Tx: Aleve prn. Not used due to stomach issues  - dry need;marilyn has helped but time consuming. Gastritis with Esophagitis:   S/p colonoscopy x2 and endoscopy  Tx: zantac    Claustrophobia/ Flight anxiety:  Has taken for MRI  Flights 3 times per year  Last Rx #50 with refill  Previous Tx: none      Acute Concerns:  Bumps on her fingers, right hand    Left arm tingling and rash for 2 days, now resolved      Social:   Works at [employer]. 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 All other systems  reviewed are negative for a Comprehensive ROS (10+). Past Medical History:   Diagnosis Date    Depression 12/08    Menopause 2006    Paroxysmal atrial flutter [facility])     resolved with ablation 2/2010 dr orellana    Stroke [facility])         Social History     Social History    Marital status:      Spouse name: N/A    Number of children: N/A    Years of education: N/A     Occupational History    Not on file. Social History Main Topics    Smoking status: Never Smoker    Smokeless tobacco: Never Used    Alcohol use 1.5 oz/week     3 Standard drinks or equivalent per week      Comment: maybe 2-3 glasses wine/week    Drug use: Not on file    Sexual activity: Not on file     Other Topics Concern    Not on file     Social History Narrative       Current Outpatient Prescriptions   Medication Sig    buPROPion SR (WELLBUTRIN, ZYBAN) 200 mg SR tablet TAKE 2 TABLETS DAILY FOR ANXIETY WITH DEPRESSION    fexofenadine (ALLEGRA) 180 mg tablet Take 1 Tab by mouth daily.     ALPRAZolam (XANAX) 0.25 mg tablet TAKE 1/2 TO 1 TABLET BY MOUTH TWICE A DAY AS NEEDED FOR ANXIETY    aspirin 81 mg chewable tablet Take 1 Tab by mouth daily. Can be obtained over-the-counter    raNITIdine (ZANTAC) 150 mg tablet Take 150 mg by mouth five (5) days a week. Indications: pt takes 150 mg in the morning 5/7 days of the week    naproxen sodium (ALEVE) 220 mg tablet Take 220 mg by mouth two (2) times daily as needed. No current facility-administered medications for this visit. Objective:     Vitals:    06/26/18 1010   BP: 129/85   Pulse: 68   Resp: 18   Temp: 98.3 °F (36.8 °C)   TempSrc: Oral   SpO2: 100%   Weight: 133 lb 6.4 oz (60.5 kg)   Height: 5' 7\" (1.702 m)       Physical Examination:  General: Alert, cooperative, no distress, appears stated age. Eyes: Conjunctivae clear. PERRL, EOMs intact. Ears: Normal external ear canals both ears. Nose: Nares normal.   Mouth/Throat: Lips, mucosa, and tongue normal.  Neck: Supple, symmetrical, trachea midline, no adenopathy. No thyroid enlargement/tenderness/nodules  Back: Symmetric, no curvature. ROM normal.   Lungs: Clear to auscultation bilaterally. Normal inspiratory and expiratory ratio. Heart: Regular rate and rhythm, S1, S2 normal, no murmur, click, rub or gallop. Abdomen: Soft, non-tender. Bowel sounds normal. No masses or organomegaly. Extremities: Extremities normal, atraumatic, no cyanosis or edema. Pulses: 2+ and symmetric all extremities. MSK: left thumb with mild ulnar deviation  Skin: ~2mm flesh colored papules on right 3rd DIP, left thumb dip and left arm  Lymph nodes: Cervical, supraclavicular nodes normal.  Neurologic: CNII-XII intact. Strength 5/5 grossly. Sensation and reflexes normal throughout. No visits with results within 3 Month(s) from this visit.   Latest known visit with results is:    Admission on 11/17/2017, Discharged on 11/18/2017   Component Date Value Ref Range Status    WBC 11/17/2017 4.9  3.6 - 11.0 K/uL Final    RBC 11/17/2017 4.22  3.80 - 5.20 M/uL Final    HGB 11/17/2017 13.7  11.5 - 16.0 g/dL Final    HCT 11/17/2017 40.9  35.0 - 47.0 % Final    MCV 11/17/2017 96.9  80.0 - 99.0 FL Final    MCH 11/17/2017 32.5  26.0 - 34.0 PG Final    MCHC 11/17/2017 33.5  30.0 - 36.5 g/dL Final    RDW 11/17/2017 12.2  11.5 - 14.5 % Final    PLATELET 68/08/6318 147  150 - 400 K/uL Final    NEUTROPHILS 11/17/2017 61  32 - 75 % Final    LYMPHOCYTES 11/17/2017 31  12 - 49 % Final    MONOCYTES 11/17/2017 7  5 - 13 % Final    EOSINOPHILS 11/17/2017 1  0 - 7 % Final    BASOPHILS 11/17/2017 0  0 - 1 % Final    ABS. NEUTROPHILS 11/17/2017 3.0  1.8 - 8.0 K/UL Final    ABS. LYMPHOCYTES 11/17/2017 1.5  0.8 - 3.5 K/UL Final    ABS. MONOCYTES 11/17/2017 0.4  0.0 - 1.0 K/UL Final    ABS. EOSINOPHILS 11/17/2017 0.1  0.0 - 0.4 K/UL Final    ABS. BASOPHILS 11/17/2017 0.0  0.0 - 0.1 K/UL Final    Sodium 11/17/2017 140  136 - 145 mmol/L Final    Potassium 11/17/2017 3.7  3.5 - 5.1 mmol/L Final    Chloride 11/17/2017 104  97 - 108 mmol/L Final    CO2 11/17/2017 29  21 - 32 mmol/L Final    Anion gap 11/17/2017 7  5 - 15 mmol/L Final    Glucose 11/17/2017 94  65 - 100 mg/dL Final    BUN 11/17/2017 15  6 - 20 MG/DL Final    Creatinine 11/17/2017 0.94  0.55 - 1.02 MG/DL Final    BUN/Creatinine ratio 11/17/2017 16  12 - 20   Final    GFR est AA 11/17/2017 >60  >60 ml/min/1.73m2 Final    GFR est non-AA 11/17/2017 >60  >60 ml/min/1.73m2 Final    Comment: Estimated GFR is calculated using the IDMS-traceable Modification of Diet in Renal Disease (MDRD) Study equation, reported for both  Americans (GFRAA) and non- Americans (GFRNA), and normalized to 1.73m2 body surface area. The physician must decide which value applies to the patient. The MDRD study equation should only be used in individuals age 25 or older.  It has not been validated for the following: pregnant women, patients with serious comorbid conditions, or on certain medications, or persons with extremes of body size, muscle mass, or nutritional status.  Calcium 11/17/2017 9.5  8.5 - 10.1 MG/DL Final    Bilirubin, total 11/17/2017 0.6  0.2 - 1.0 MG/DL Final    ALT (SGPT) 11/17/2017 39  12 - 78 U/L Final    AST (SGOT) 11/17/2017 21  15 - 37 U/L Final    Alk. phosphatase 11/17/2017 61  45 - 117 U/L Final    Protein, total 11/17/2017 7.5  6.4 - 8.2 g/dL Final    Albumin 11/17/2017 4.1  3.5 - 5.0 g/dL Final    Globulin 11/17/2017 3.4  2.0 - 4.0 g/dL Final    A-G Ratio 11/17/2017 1.2  1.1 - 2.2   Final    INR 11/17/2017 1.1  0.9 - 1.1   Final    A single therapeutic range for Vit K antagonists may not be optimal for all indications - see June, 2008 issue of Chest, American College of Chest Physicians Evidence-Based Clinical Practice Guidelines, 8th Edition.  Prothrombin time 11/17/2017 11.2* 9.0 - 11.1 sec Final    aPTT 11/17/2017 28.0  22.1 - 32.5 sec Final    In addition to factor deficiency, monitoring heparin therapy, etc., evaluation of a prolonged aPTT result should include consideration of preanalytic variables such as heparin flush contamination, specimen integrity issues, etc.    aPTT, therapeutic range 11/17/2017      58.0 - 77.0 SECS Final    Troponin-I, Qt. 11/17/2017 <0.04  <0.05 ng/mL Final    Comment: The presence of detectable troponin above the reference range indicates myocardial injury which may be due to ischemia, myocarditis, trauma, etc.  Clinical correlation is necessary to establish the significance of this finding. Sequential testing is recommended to determine if the typical rise and fall of cTnI is demonstrated. Note:  Cardiac troponin I has a relatively long half life and may be present well after the CK MB has returned to baseline. The reference range is based on the 99th percentile of the referent population.       Ventricular Rate 11/17/2017 79  BPM Final    Atrial Rate 11/17/2017 79  BPM Final    P-R Interval 11/17/2017 142  ms Final    QRS Duration 11/17/2017 92  ms Final    Q-T Interval 11/17/2017 392  ms Final    QTC Calculation (Bezet) 11/17/2017 449  ms Final    Calculated P Axis 11/17/2017 81  degrees Final    Calculated R Axis 11/17/2017 37  degrees Final    Calculated T Axis 11/17/2017 62  degrees Final    Diagnosis 11/17/2017    Final                    Value:Normal sinus rhythm  When compared with ECG of 15-FEB-2010 12:27,  No significant change was found  Confirmed by Megan Carter MD. ([id number]) on 11/19/2017 10:57:35 PM      INR (POC) 11/17/2017 1.0  <1.2   Final    The intended use of the i-STAT PT/INR is for monitoring oral anticoagulant therapy and not for evaluating individual factor deficiencies.  TSH 11/18/2017 3.06  0.36 - 3.74 uIU/mL Final    Comment: (NOTE)  Due to TSH heterogeneity, both structurally and degree of   glycosylation, monoclonal antibodies used in the TSH assay may not   accurately quantitate TSH. Therefore, this result should be   correlated with clinical findings as well as with other assessments   of thyroid function, e.g., free T4, free T3.      LIPID PROFILE 11/18/2017        Final    Cholesterol, total 11/18/2017 182  <200 MG/DL Final    Triglyceride 11/18/2017 36  <150 MG/DL Final    Based on NCEP-ATP III:  Triglycerides <150 mg/dL  is considered normal, 150-199 mg/dL  borderline high,  200-499 mg/dL high and  greater than or equal to 500 mg/dL very high.  HDL Cholesterol 11/18/2017 99  MG/DL Final    Based on NCEP ATP III, HDL Cholesterol <40 mg/dL is considered low and >60 mg/dL is elevated.     LDL, calculated 11/18/2017 75.8  0 - 100 MG/DL Final    Comment: Based on the NCEP-ATP: LDL-C concentrations are considered  optimal <100 mg/dL,  near optimal/above Normal 100-129 mg/dL  Borderline High: 130-159, High: 160-189 mg/dL  Very High: Greater than or equal to 190 mg/dL      VLDL, calculated 11/18/2017 7.2  MG/DL Final    CHOL/HDL Ratio 11/18/2017 1.8  0 - 5.0   Final    Hemoglobin A1c 11/18/2017 5.3  4.2 - 6.3 % Final    Est. average glucose 11/18/2017 105  mg/dL Final    Comment: (NOTE)  The eAG should be interpreted with patient characteristics in mind   since ethnicity, interindividual differences, red cell lifespan,   variation in rates of glycation, etc. may affect the validity of the   calculation.  WBC 11/18/2017 4.6  3.6 - 11.0 K/uL Final    RBC 11/18/2017 4.12  3.80 - 5.20 M/uL Final    HGB 11/18/2017 13.5  11.5 - 16.0 g/dL Final    HCT 11/18/2017 39.8  35.0 - 47.0 % Final    MCV 11/18/2017 96.6  80.0 - 99.0 FL Final    MCH 11/18/2017 32.8  26.0 - 34.0 PG Final    MCHC 11/18/2017 33.9  30.0 - 36.5 g/dL Final    RDW 11/18/2017 12.1  11.5 - 14.5 % Final    PLATELET 00/99/5837 743  150 - 400 K/uL Final    Troponin-I, Qt. 11/18/2017 <0.04  <0.05 ng/mL Final    Glucose (POC) 11/18/2017 73  65 - 100 mg/dL Final    Comment: Notified RN or MD immediately by   (NOTE)  The Accu-Chek Inform II glucometer is not FDA cleared for critically   ill patient use. A study was performed validating the equivalence of   glucometer and clinical laboratory results on this patient   population. Despite the study, use of glucometers with capillary   specimens from critically ill patients, regardless of their location,   makes the test high complexity and requires the performing individual   to comply with CLIA requirements more stringent than those for waived   testing in the hospital setting. Critical thinking skills are   necessary to determine a potentially critically ill patients status   prior to using a glucometer.  Performed by 11/18/2017 Huber Bishop   Final    Glucose (POC) 11/18/2017 69  65 - 100 mg/dL Final    Comment: (NOTE)  The Accu-Chek Inform II glucometer is not FDA cleared for critically   ill patient use. A study was performed validating the equivalence of   glucometer and clinical laboratory results on this patient   population.  Despite the study, use of glucometers with capillary   specimens from critically ill patients, regardless of their location,   makes the test high complexity and requires the performing individual   to comply with CLIA requirements more stringent than those for waived   testing in the hospital setting. Critical thinking skills are   necessary to determine a potentially critically ill patients status   prior to using a glucometer.  Performed by 11/18/2017 Claudia Mention   Final    Glucose (POC) 11/18/2017 92  65 - 100 mg/dL Final    Comment: (NOTE)  The Accu-Chek Inform II glucometer is not FDA cleared for critically   ill patient use. A study was performed validating the equivalence of   glucometer and clinical laboratory results on this patient   population. Despite the study, use of glucometers with capillary   specimens from critically ill patients, regardless of their location,   makes the test high complexity and requires the performing individual   to comply with CLIA requirements more stringent than those for waived   testing in the hospital setting. Critical thinking skills are   necessary to determine a potentially critically ill patients status   prior to using a glucometer.  Performed by 11/18/2017 Claudia Mention   Final    Glucose (POC) 11/18/2017 107* 65 - 100 mg/dL Final    Comment: (NOTE)  The Accu-Chek Inform II glucometer is not FDA cleared for critically   ill patient use. A study was performed validating the equivalence of   glucometer and clinical laboratory results on this patient   population. Despite the study, use of glucometers with capillary   specimens from critically ill patients, regardless of their location,   makes the test high complexity and requires the performing individual   to comply with CLIA requirements more stringent than those for waived   testing in the hospital setting. Critical thinking skills are   necessary to determine a potentially critically ill patients status   prior to using a glucometer.       Performed by 11/18/2017 Humberto Ryder   Final           Assessment/ Plan:   Diagnoses and all orders for this visit:    1. Recurrent major depressive disorder, in full remission ([facility] [address]  -     buPROPion SR (WELLBUTRIN, ZYBAN) 200 mg SR tablet; TAKE 2 TABLETS DAILY FOR ANXIETY WITH DEPRESSION    2. Vertebrobasilar artery syndrome    3. Sleep disturbance    4. Perennial allergic rhinitis    5. DDD (degenerative disc disease), cervical    6. History of gastritis    7. Claustrophobia    8. Situational anxiety    9. Other viral warts    10. Osteoarthritis of left hand, unspecified osteoarthritis type      Depression well controlled  Chronic disease reviewed and stable  Will need controlled substance agreement and UDS before any xanax refill  Wart like papule of hand. RTC if pain, itching or redness develops. Physical exam suggest OA of hands. Otc analgesia prn. If pain becomes uncontrolled will xray to evaluate. WWE due. Total encounter time 40 minutes; >505 of time spent counseling/coordinating care of depression and review of multiple other chronic disease listed above. I have discussed the diagnosis with the patient and the intended plan as seen in the above orders. The patient has received an after-visit summary and questions were answered concerning future plans. I have discussed medication side effects and warnings with the patient as well. Follow-up Disposition:  Return in about 6 months (around 12/26/2018) for Follow Up.       Signed,    Kira Perez MD  6/26/2018

## 2018-06-27 PROBLEM — M50.30 DDD (DEGENERATIVE DISC DISEASE), CERVICAL: Status: ACTIVE | Noted: 2018-06-27

## 2018-06-27 PROBLEM — J30.89 PERENNIAL ALLERGIC RHINITIS: Status: ACTIVE | Noted: 2018-06-27

## 2019-01-07 DIAGNOSIS — F33.42 RECURRENT MAJOR DEPRESSIVE DISORDER, IN FULL REMISSION (HCC): ICD-10-CM

## 2019-01-07 RX ORDER — BUPROPION HYDROCHLORIDE 200 MG/1
TABLET, EXTENDED RELEASE ORAL
Qty: 60 TAB | Refills: 0 | Status: SHIPPED | OUTPATIENT
Start: 2019-01-07 | End: 2019-01-23 | Stop reason: SDUPTHER

## 2019-01-23 ENCOUNTER — OFFICE VISIT (OUTPATIENT)
Dept: FAMILY MEDICINE CLINIC | Age: 54
End: 2019-01-23

## 2019-01-23 VITALS
HEART RATE: 68 BPM | WEIGHT: 139 LBS | OXYGEN SATURATION: 99 % | HEIGHT: 67 IN | DIASTOLIC BLOOD PRESSURE: 84 MMHG | SYSTOLIC BLOOD PRESSURE: 133 MMHG | TEMPERATURE: 98.1 F | BODY MASS INDEX: 21.82 KG/M2 | RESPIRATION RATE: 16 BRPM

## 2019-01-23 DIAGNOSIS — Z00.00 WELL WOMAN EXAM (NO GYNECOLOGICAL EXAM): Primary | ICD-10-CM

## 2019-01-23 DIAGNOSIS — M79.605 LEFT LEG PAIN: ICD-10-CM

## 2019-01-23 DIAGNOSIS — F33.42 RECURRENT MAJOR DEPRESSIVE DISORDER, IN FULL REMISSION (HCC): ICD-10-CM

## 2019-01-23 DIAGNOSIS — Z23 ENCOUNTER FOR IMMUNIZATION: ICD-10-CM

## 2019-01-23 RX ORDER — BUPROPION HYDROCHLORIDE 200 MG/1
TABLET, EXTENDED RELEASE ORAL
Qty: 180 TAB | Refills: 3 | Status: SHIPPED | OUTPATIENT
Start: 2019-01-23 | End: 2019-04-11 | Stop reason: SDUPTHER

## 2019-01-23 NOTE — PROGRESS NOTES
[facility] Note Subjective: Chief Complaint Patient presents with  Medication Evaluation  Labs  
  not fasting Eleuterio Cunningham is a 47y.o. year old female who presents for evaluation of the following: 
 
Depression:  
Chronic since 1997 Tx: Wellbutrin 
- needs refill Mood: \"under stress\" STEVEN: 5>7 PHQ 5>11 PHQ over the last two weeks 1/23/2019 Little interest or pleasure in doing things Several days Feeling down, depressed, irritable, or hopeless Several days Total Score PHQ 2 2 Trouble falling or staying asleep, or sleeping too much More than half the days Feeling tired or having little energy More than half the days Poor appetite, weight loss, or overeating Several days Feeling bad about yourself - or that you are a failure or have let yourself or your family down Not at all Trouble concentrating on things such as school, work, reading, or watching TV Nearly every day Moving or speaking so slowly that other people could have noticed; or the opposite being so fidgety that others notice Several days Thoughts of being better off dead, or hurting yourself in some way Not at all PHQ 9 Score 11 How difficult have these problems made it for you to do your work, take care of your home and get along with others - Leg Pain Onset today Tingling and numbness intermittent on left buttock down side of leg to foot Started in the car on the way here Known DDD cervical spine No current pain No leg swelling, back pain Health Maintenance:  
Diet: balanced BI 21 TDaP: Not on file Influenza: Due Pneumovax: Not due Prevnar @65: Not due Shingles @60: Advised patient request from pharmacy Colonoscopy @50: not due/ No family history ASA @ 55f and 45m: Not due Dexa @65: Not due HIV or other STD testing: Declined Domestic Violence Screen: Negative Smoker? No 
 
Pap:  Last 2018 wiuth GYN Mammogram: Last 2017 No LMP recorded. Patient has had a hysterectomy. has no sexual activity history on file. Care Team:  
Dentist- Dr. Anshul Casillas- Seen in past 1 year GYN- Dr. Erik Alcantara Cardiology: Dr. Efren Pillai Allergist: Rajat Allergy GI: Dr. Edel Humphries Social:  
Works at [employer] 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  
Dentist- Dr. Anshul Casillas- Seen in past 1 year GYN- Dr. Braxton Brian Cardiology: Dr. Efren Pillai Allergist: Rajat Allergy GI: Dr. Edel Humphries Review of Systems Pertinent positives and negative per HPI. All other systems  reviewed are negative for a Comprehensive ROS (10+). Past Medical History:  
Diagnosis Date  Depression 12/08  Menopause 2006  Paroxysmal atrial flutter [facility])   
 resolved with ablation 2/2010 dr orellana  [facility] Social History Socioeconomic History  Marital status:  Spouse name: Not on file  Number of children: Not on file  Years of education: Not on file  Highest education level: Not on file Social Needs  Financial resource strain: Not on file  Food insecurity - worry: Not on file  Food insecurity - inability: Not on file  Transportation needs - medical: Not on file  Transportation needs - non-medical: Not on file Occupational History  Not on file Tobacco Use  Smoking status: Never Smoker  Smokeless tobacco: Never Used Substance and Sexual Activity  Alcohol use: Yes Alcohol/week: 1.5 oz Types: 3 Standard drinks or equivalent per week Comment: maybe 2-3 glasses wine/week  Drug use: Not on file  Sexual activity: Not on file Other Topics Concern  Not on file Social History Narrative  Not on file Current Outpatient Medications Medication Sig  Lactobacillus acidophilus (PROBIOTIC) 10 billion cell cap Take  by mouth.  buPROPion SR (WELLBUTRIN, ZYBAN) 200 mg SR tablet TAKE 2 TABLETS DAILY FOR ANXIETY WITH DEPRESSION  fexofenadine (ALLEGRA) 180 mg tablet Take 1 Tab by mouth daily.  ALPRAZolam (XANAX) 0.25 mg tablet TAKE 1/2 TO 1 TABLET BY MOUTH TWICE A DAY AS NEEDED FOR ANXIETY (Patient taking differently: 0.5 mg. TAKE 1/2 TO 1 TABLET BY MOUTH TWICE A DAY AS NEEDED FOR ANXIETY)  raNITIdine (ZANTAC) 150 mg tablet Take 150 mg by mouth five (5) days a week. Indications: pt takes 150 mg in the morning 5/7 days of the week  naproxen sodium (ALEVE) 220 mg tablet Take 220 mg by mouth two (2) times daily as needed.  aspirin 81 mg chewable tablet Take 1 Tab by mouth daily. Can be obtained over-the-counter No current facility-administered medications for this visit. Objective:  
 
Vitals:  
 01/23/19 1650 BP: 133/84 Pulse: 68 Resp: 16 Temp: 98.1 °F (36.7 °C) TempSrc: Oral  
SpO2: 99% Weight: 139 lb (63 kg) Height: 5' 7\" (1.702 m) Physical Examination: 
General: Alert, cooperative, no distress, appears stated age. Eyes: Conjunctivae clear. PERRL, EOMs intact. Ears: Normal external ear canals both ears. Nose: Nares normal.  
Mouth/Throat: Lips, mucosa, and tongue normal. 
Neck: Supple, symmetrical, trachea midline, no adenopathy. No thyroid enlargement/tenderness/nodules Back: Symmetric, no curvature. ROM normal. 
- left lumbar paraspinal muscle tenderness - Negative straight leg raise Lungs: Clear to auscultation bilaterally. Normal inspiratory and expiratory ratio. Heart: Regular rate and rhythm, S1, S2 normal, no murmur, click, rub or gallop. Abdomen: Soft, non-tender. Bowel sounds normal. No masses or organomegaly. Extremities: Extremities normal, atraumatic, no cyanosis or edema. Pulses: 2+ and symmetric all extremities. MSK: gait steady and unassisted. Skin: No rash on exposed skin Lymph nodes: Cervical, supraclavicular nodes normal. 
 Neurologic: CNII-XII intact. Strength 5/5 grossly. Sensation and reflexes normal throughout. No visits with results within 3 Month(s) from this visit. Latest known visit with results is:  
Admission on 11/17/2017, Discharged on 11/18/2017 Component Date Value Ref Range Status  WBC 11/17/2017 4.9  3.6 - 11.0 K/uL Final  
 RBC 11/17/2017 4.22  3.80 - 5.20 M/uL Final  
 HGB 11/17/2017 13.7  11.5 - 16.0 g/dL Final  
 HCT 11/17/2017 40.9  35.0 - 47.0 % Final  
 MCV 11/17/2017 96.9  80.0 - 99.0 FL Final  
 MCH 11/17/2017 32.5  26.0 - 34.0 PG Final  
 MCHC 11/17/2017 33.5  30.0 - 36.5 g/dL Final  
 RDW 11/17/2017 12.2  11.5 - 14.5 % Final  
 PLATELET 57/55/8192 430  150 - 400 K/uL Final  
 NEUTROPHILS 11/17/2017 61  32 - 75 % Final  
 LYMPHOCYTES 11/17/2017 31  12 - 49 % Final  
 MONOCYTES 11/17/2017 7  5 - 13 % Final  
 EOSINOPHILS 11/17/2017 1  0 - 7 % Final  
 BASOPHILS 11/17/2017 0  0 - 1 % Final  
 ABS. NEUTROPHILS 11/17/2017 3.0  1.8 - 8.0 K/UL Final  
 ABS. LYMPHOCYTES 11/17/2017 1.5  0.8 - 3.5 K/UL Final  
 ABS. MONOCYTES 11/17/2017 0.4  0.0 - 1.0 K/UL Final  
 ABS. EOSINOPHILS 11/17/2017 0.1  0.0 - 0.4 K/UL Final  
 ABS.  BASOPHILS 11/17/2017 0.0  0.0 - 0.1 K/UL Final  
 Sodium 11/17/2017 140  136 - 145 mmol/L Final  
 Potassium 11/17/2017 3.7  3.5 - 5.1 mmol/L Final  
 Chloride 11/17/2017 104  97 - 108 mmol/L Final  
 CO2 11/17/2017 29  21 - 32 mmol/L Final  
 Anion gap 11/17/2017 7  5 - 15 mmol/L Final  
 Glucose 11/17/2017 94  65 - 100 mg/dL Final  
 BUN 11/17/2017 15  6 - 20 MG/DL Final  
 Creatinine 11/17/2017 0.94  0.55 - 1.02 MG/DL Final  
 BUN/Creatinine ratio 11/17/2017 16  12 - 20   Final  
 GFR est AA 11/17/2017 >60  >60 ml/min/1.73m2 Final  
 GFR est non-AA 11/17/2017 >60  >60 ml/min/1.73m2 Final  
 Comment: Estimated GFR is calculated using the IDMS-traceable Modification of Diet in Renal Disease (MDRD) Study equation, reported for both  Americans (GFRAA) and non- Americans (GFRNA), and normalized to 1.73m2 body surface area. The physician must decide which value applies to the patient. The MDRD study equation should only be used in individuals age 25 or older. It has not been validated for the following: pregnant women, patients with serious comorbid conditions, or on certain medications, or persons with extremes of body size, muscle mass, or nutritional status.  Calcium 11/17/2017 9.5  8.5 - 10.1 MG/DL Final  
 Bilirubin, total 11/17/2017 0.6  0.2 - 1.0 MG/DL Final  
 ALT (SGPT) 11/17/2017 39  12 - 78 U/L Final  
 AST (SGOT) 11/17/2017 21  15 - 37 U/L Final  
 Alk. phosphatase 11/17/2017 61  45 - 117 U/L Final  
 Protein, total 11/17/2017 7.5  6.4 - 8.2 g/dL Final  
 Albumin 11/17/2017 4.1  3.5 - 5.0 g/dL Final  
 Globulin 11/17/2017 3.4  2.0 - 4.0 g/dL Final  
 A-G Ratio 11/17/2017 1.2  1.1 - 2.2   Final  
 INR 11/17/2017 1.1  0.9 - 1.1   Final  
 A single therapeutic range for Vit K antagonists may not be optimal for all indications - see June, 2008 issue of Chest, American College of Chest Physicians Evidence-Based Clinical Practice Guidelines, 8th Edition.  Prothrombin time 11/17/2017 11.2* 9.0 - 11.1 sec Final  
 aPTT 11/17/2017 28.0  22.1 - 32.5 sec Final  
 In addition to factor deficiency, monitoring heparin therapy, etc., evaluation of a prolonged aPTT result should include consideration of preanalytic variables such as heparin flush contamination, specimen integrity issues, etc.  
 aPTT, therapeutic range 11/17/2017      58.0 - 77.0 SECS Final  
 Troponin-I, Qt. 11/17/2017 <0.04  <0.05 ng/mL Final  
 Comment: The presence of detectable troponin above the reference range indicates myocardial injury which may be due to ischemia, myocarditis, trauma, etc. 
Clinical correlation is necessary to establish the significance of this finding. Sequential testing is recommended to determine if the typical rise and fall of cTnI is demonstrated. Note:  Cardiac troponin I has a relatively long half life and may be present well after the CK MB has returned to baseline. The reference range is based on the 99th percentile of the referent population.  Ventricular Rate 11/17/2017 79  BPM Final  
 Atrial Rate 11/17/2017 79  BPM Final  
 P-R Interval 11/17/2017 142  ms Final  
 QRS Duration 11/17/2017 92  ms Final  
 Q-T Interval 11/17/2017 392  ms Final  
 QTC Calculation (Bezet) 11/17/2017 449  ms Final  
 Calculated P Axis 11/17/2017 81  degrees Final  
 Calculated R Axis 11/17/2017 37  degrees Final  
 Calculated T Axis 11/17/2017 62  degrees Final  
 Diagnosis 11/17/2017    Final  
                 Value:Normal sinus rhythm When compared with ECG of 15-FEB-2010 12:27, No significant change was found Confirmed by Radha Frederick MD. ([id number]) on 11/19/2017 10:57:35 PM 
  
 INR (POC) 11/17/2017 1.0  <1.2   Final  
 The intended use of the i-STAT PT/INR is for monitoring oral anticoagulant therapy and not for evaluating individual factor deficiencies.  TSH 11/18/2017 3.06  0.36 - 3.74 uIU/mL Final  
 Comment: (NOTE) Due to TSH heterogeneity, both structurally and degree of  
glycosylation, monoclonal antibodies used in the TSH assay may not  
accurately quantitate TSH. Therefore, this result should be  
correlated with clinical findings as well as with other assessments  
of thyroid function, e.g., free T4, free T3. 
  
 LIPID PROFILE 11/18/2017        Final  
 Cholesterol, total 11/18/2017 182  <200 MG/DL Final  
 Triglyceride 11/18/2017 36  <150 MG/DL Final  
 Based on NCEP-ATP III:  Triglycerides <150 mg/dL  is considered normal, 150-199 mg/dL  borderline high,  200-499 mg/dL high and  greater than or equal to 500 mg/dL very high.   
 HDL Cholesterol 11/18/2017 99  MG/DL Final  
 Based on NCEP ATP III, HDL Cholesterol <40 mg/dL is considered low and >60 mg/dL is elevated.  LDL, calculated 11/18/2017 75.8  0 - 100 MG/DL Final  
 Comment: Based on the NCEP-ATP: LDL-C concentrations are considered  optimal <100 mg/dL, 
near optimal/above Normal 100-129 mg/dL Borderline High: 130-159, High: 160-189 mg/dL Very High: Greater than or equal to 190 mg/dL  VLDL, calculated 11/18/2017 7.2  MG/DL Final  
 CHOL/HDL Ratio 11/18/2017 1.8  0 - 5.0   Final  
 Hemoglobin A1c 11/18/2017 5.3  4.2 - 6.3 % Final  
 Est. average glucose 11/18/2017 105  mg/dL Final  
 Comment: (NOTE) The eAG should be interpreted with patient characteristics in mind  
since ethnicity, interindividual differences, red cell lifespan,  
variation in rates of glycation, etc. may affect the validity of the  
calculation.  WBC 11/18/2017 4.6  3.6 - 11.0 K/uL Final  
 RBC 11/18/2017 4.12  3.80 - 5.20 M/uL Final  
 HGB 11/18/2017 13.5  11.5 - 16.0 g/dL Final  
 HCT 11/18/2017 39.8  35.0 - 47.0 % Final  
 MCV 11/18/2017 96.6  80.0 - 99.0 FL Final  
 MCH 11/18/2017 32.8  26.0 - 34.0 PG Final  
 MCHC 11/18/2017 33.9  30.0 - 36.5 g/dL Final  
 RDW 11/18/2017 12.1  11.5 - 14.5 % Final  
 PLATELET 62/56/9033 218  150 - 400 K/uL Final  
 Troponin-I, Qt. 11/18/2017 <0.04  <0.05 ng/mL Final  
 Glucose (POC) 11/18/2017 73  65 - 100 mg/dL Final  
 Comment: Notified RN or MD immediately by  
(NOTE) The Accu-Chek Inform II glucometer is not FDA cleared for critically  
ill patient use. A study was performed validating the equivalence of  
glucometer and clinical laboratory results on this patient  
population. Despite the study, use of glucometers with capillary  
specimens from critically ill patients, regardless of their location,  
makes the test high complexity and requires the performing individual  
to comply with CLIA requirements more stringent than those for waived testing in the hospital setting. Critical thinking skills are  
necessary to determine a potentially critically ill patients status  
prior to using a glucometer.  Performed by 11/18/2017 [facility]   Final  
 Glucose (POC) 11/18/2017 69  65 - 100 mg/dL Final  
 Comment: (NOTE) The Accu-Chek Inform II glucometer is not FDA cleared for critically  
ill patient use. A study was performed validating the equivalence of  
glucometer and clinical laboratory results on this patient  
population. Despite the study, use of glucometers with capillary  
specimens from critically ill patients, regardless of their location,  
makes the test high complexity and requires the performing individual  
to comply with CLIA requirements more stringent than those for waived  
testing in the hospital setting. Critical thinking skills are  
necessary to determine a potentially critically ill patients status  
prior to using a glucometer.  Performed by 11/18/2017 [facility]   Final  
 Glucose (POC) 11/18/2017 92  65 - 100 mg/dL Final  
 Comment: (NOTE) The Accu-Chek Inform II glucometer is not FDA cleared for critically  
ill patient use. A study was performed validating the equivalence of  
glucometer and clinical laboratory results on this patient  
population. Despite the study, use of glucometers with capillary  
specimens from critically ill patients, regardless of their location,  
makes the test high complexity and requires the performing individual  
to comply with CLIA requirements more stringent than those for waived  
testing in the hospital setting. Critical thinking skills are  
necessary to determine a potentially critically ill patients status  
prior to using a glucometer.  Performed by 11/18/2017 [facility]   Final  
 Glucose (POC) 11/18/2017 107* 65 - 100 mg/dL Final  
 Comment: (NOTE) The Accu-Chek Inform II glucometer is not FDA cleared for critically ill patient use. A study was performed validating the equivalence of  
glucometer and clinical laboratory results on this patient  
population. Despite the study, use of glucometers with capillary  
specimens from critically ill patients, regardless of their location,  
makes the test high complexity and requires the performing individual  
to comply with CLIA requirements more stringent than those for waived  
testing in the hospital setting. Critical thinking skills are  
necessary to determine a potentially critically ill patients status  
prior to using a glucometer.  Performed by 11/18/2017 [facility]   Final  
 
 
 
 
Assessment/ Plan:  
Diagnoses and all orders for this visit: 
 
1. Well woman exam (no gynecological exam) -     CBC WITH AUTOMATED DIFF 
-     METABOLIC PANEL, COMPREHENSIVE 
-     LIPID PANEL 2. Recurrent major depressive disorder, in full remission ([facility] -     buPROPion SR (WELLBUTRIN, ZYBAN) 200 mg SR tablet; TAKE 2 TABLETS DAILY FOR ANXIETY WITH DEPRESSION 3. Left leg pain 4. Encounter for immunization 
-     INFLUENZA VIRUS VAC QUAD,SPLIT,PRESV FREE SYRINGE IM 
-     ME IMMUNIZ ADMIN,1 SINGLE/COMB VAC/TOXOID Other orders -     CVD REPORT Doing well ovrall Abnormal findings discussed below. STI screen declined. Labs to eval end organ function today Vaccines reviewed. Pap not due 2/2 hysterectomy. Depression well controlled. Refilled wellbutrin. Encouraged counseling. Leg pain likely form DDD lumbar spine. Back stretches, yoga + NSAID prn. No red flag symptoms. Total encounter time 40 minutes; >505 of time spent counseling/coordinating care of depression and review of multiple other chronic disease listed above. I have discussed the diagnosis with the patient and the intended plan as seen in the above orders. The patient has received an after-visit summary and questions were answered concerning future plans.   I have discussed medication side effects and warnings with the patient as well. Follow-up Disposition: Not on File Signed, Rimma Spangler MD 
1/23/2019

## 2019-01-23 NOTE — PROGRESS NOTES
Identified pt with two pt identifiers(name and ). Reviewed record in preparation for visit and have obtained necessary documentation. Chief Complaint Patient presents with  Medication Evaluation  Labs  
  not fasting Health Maintenance Due Topic  Shingrix Vaccine Age 50> (1 of 2)  FOBT Q 1 YEAR AGE 50-75  Influenza Age 5 to Adult Coordination of Care Questionnaire: 
:  
1) Have you been to an emergency room, urgent care, or hospitalized since your last visit? yes If yes, where when, and reason for visit? [facility] for sinus pain 2. Have seen or consulted any other health care provider since your last visit? NO If yes, where when, and reason for visit? 3) Do you have an Advanced Directive/ Living Will in place? NO If yes, do we have a copy on file NO If no, would you like information NO Patient is accompanied by self I have received verbal consent from Satinder Zavala to discuss any/all medical information while they are present in the room. Visit Vitals /84 (BP 1 Location: Right arm, BP Patient Position: Sitting) Pulse 68 Temp 98.1 °F (36.7 °C) (Oral) Resp 16 Ht 5' 7\" (1.702 m) Wt 139 lb (63 kg) SpO2 99% BMI 21.77 kg/m² Keven Acosta LPN

## 2019-01-24 LAB
ALBUMIN SERPL-MCNC: 4.9 G/DL (ref 3.5–5.5)
ALBUMIN/GLOB SERPL: 2.1 {RATIO} (ref 1.2–2.2)
ALP SERPL-CCNC: 61 IU/L (ref 39–117)
ALT SERPL-CCNC: 22 IU/L (ref 0–32)
AST SERPL-CCNC: 20 IU/L (ref 0–40)
BASOPHILS # BLD AUTO: 0 X10E3/UL (ref 0–0.2)
BASOPHILS NFR BLD AUTO: 0 %
BILIRUB SERPL-MCNC: 0.3 MG/DL (ref 0–1.2)
BUN SERPL-MCNC: 11 MG/DL (ref 6–24)
BUN/CREAT SERPL: 13 (ref 9–23)
CALCIUM SERPL-MCNC: 9.8 MG/DL (ref 8.7–10.2)
CHLORIDE SERPL-SCNC: 101 MMOL/L (ref 96–106)
CHOLEST SERPL-MCNC: 186 MG/DL (ref 100–199)
CO2 SERPL-SCNC: 25 MMOL/L (ref 20–29)
CREAT SERPL-MCNC: 0.86 MG/DL (ref 0.57–1)
EOSINOPHIL # BLD AUTO: 0.1 X10E3/UL (ref 0–0.4)
EOSINOPHIL NFR BLD AUTO: 2 %
ERYTHROCYTE [DISTWIDTH] IN BLOOD BY AUTOMATED COUNT: 12.7 % (ref 12.3–15.4)
GLOBULIN SER CALC-MCNC: 2.3 G/DL (ref 1.5–4.5)
GLUCOSE SERPL-MCNC: 101 MG/DL (ref 65–99)
HCT VFR BLD AUTO: 41.9 % (ref 34–46.6)
HDLC SERPL-MCNC: 87 MG/DL
HGB BLD-MCNC: 14.1 G/DL (ref 11.1–15.9)
IMM GRANULOCYTES # BLD AUTO: 0 X10E3/UL (ref 0–0.1)
IMM GRANULOCYTES NFR BLD AUTO: 0 %
INTERPRETATION, 910389: NORMAL
LDLC SERPL CALC-MCNC: 88 MG/DL (ref 0–99)
LYMPHOCYTES # BLD AUTO: 1.5 X10E3/UL (ref 0.7–3.1)
LYMPHOCYTES NFR BLD AUTO: 32 %
MCH RBC QN AUTO: 33.4 PG (ref 26.6–33)
MCHC RBC AUTO-ENTMCNC: 33.7 G/DL (ref 31.5–35.7)
MCV RBC AUTO: 99 FL (ref 79–97)
MONOCYTES # BLD AUTO: 0.4 X10E3/UL (ref 0.1–0.9)
MONOCYTES NFR BLD AUTO: 9 %
NEUTROPHILS # BLD AUTO: 2.6 X10E3/UL (ref 1.4–7)
NEUTROPHILS NFR BLD AUTO: 57 %
PLATELET # BLD AUTO: 226 X10E3/UL (ref 150–379)
POTASSIUM SERPL-SCNC: 4.2 MMOL/L (ref 3.5–5.2)
PROT SERPL-MCNC: 7.2 G/DL (ref 6–8.5)
RBC # BLD AUTO: 4.22 X10E6/UL (ref 3.77–5.28)
SODIUM SERPL-SCNC: 144 MMOL/L (ref 134–144)
TRIGL SERPL-MCNC: 56 MG/DL (ref 0–149)
VLDLC SERPL CALC-MCNC: 11 MG/DL (ref 5–40)
WBC # BLD AUTO: 4.5 X10E3/UL (ref 3.4–10.8)

## 2019-01-28 NOTE — PATIENT INSTRUCTIONS
Low Back Arthritis: Exercises Your Care Instructions Here are some examples of typical rehabilitation exercises for your condition. Start each exercise slowly. Ease off the exercise if you start to have pain. Your doctor or physical therapist will tell you when you can start these exercises and which ones will work best for you. When you are not being active, find a comfortable position for rest. Some people are comfortable on the floor or a medium-firm bed with a small pillow under their head and another under their knees. Some people prefer to lie on their side with a pillow between their knees. Don't stay in one position for too long. Take short walks (10 to 20 minutes) every 2 to 3 hours. Avoid slopes, hills, and stairs until you feel better. Walk only distances you can manage without pain, especially leg pain. How to do the exercises Pelvic tilt 1. Lie on your back with your knees bent. 2. \"Brace\" your stomachtighten your muscles by pulling in and imagining your belly button moving toward your spine. 3. Press your lower back into the floor. You should feel your hips and pelvis rock back. 4. Hold for 6 seconds while breathing smoothly. 5. Relax and allow your pelvis and hips to rock forward. 6. Repeat 8 to 12 times. Back stretches 1. Get down on your hands and knees on the floor. 2. Relax your head and allow it to droop. Round your back up toward the ceiling until you feel a nice stretch in your upper, middle, and lower back. Hold this stretch for as long as it feels comfortable, or about 15 to 30 seconds. 3. Return to the starting position with a flat back while you are on your hands and knees. 4. Let your back sway by pressing your stomach toward the floor. Lift your buttocks toward the ceiling. 5. Hold this position for 15 to 30 seconds. 6. Repeat 2 to 4 times. Follow-up care is a key part of your treatment and safety.  Be sure to make and go to all appointments, and call your doctor if you are having problems. It's also a good idea to know your test results and keep a list of the medicines you take. Where can you learn more? Go to http://devendra-migue.info/. Enter S493 in the search box to learn more about \"Low Back Arthritis: Exercises. \" Current as of: September 20, 2018 Content Version: 11.9 © 2006-2018 [employer]. Care instructions adapted under license by [employer] (which disclaims liability or warranty for this information). If you have questions about a medical condition or this instruction, always ask your healthcare professional. [address] any warranty or liability for your use of this information. Recovering From Depression: Care Instructions Your Care Instructions Taking good care of yourself is important as you recover from depression. In time, your symptoms will fade as your treatment takes hold. Do not give up. Instead, focus your energy on getting better. Your mood will improve. It just takes some time. Focus on things that can help you feel better, such as being with friends and family, eating well, and getting enough rest. But take things slowly. Do not do too much too soon. You will begin to feel better gradually. Follow-up care is a key part of your treatment and safety. Be sure to make and go to all appointments, and call your doctor if you are having problems. It's also a good idea to know your test results and keep a list of the medicines you take. How can you care for yourself at home? Be realistic · If you have a large task to do, break it up into smaller steps you can handle, and just do what you can. · You may want to put off important decisions until your depression has lifted. If you have plans that will have a major impact on your life, such as marriage, divorce, or a job change, try to wait a bit.  Talk it over with friends and loved ones who can help you look at the overall picture first. 
· Reaching out to people for help is important. Do not isolate yourself. Let your family and friends help you. Find someone you can trust and confide in, and talk to that person. · Be patient, and be kind to yourself. Remember that depression is not your fault and is not something you can overcome with willpower alone. Treatment is necessary for depression, just like for any other illness. Feeling better takes time, and your mood will improve little by little. Stay active · Stay busy and get outside. Take a walk, or try some other light exercise. · Talk with your doctor about an exercise program. Exercise can help with mild depression. · Go to a movie or concert. Take part in a Sabianist activity or other social gathering. Go to a EAR[employer]ORY game. · Ask a friend to have dinner with you. Take care of yourself · Eat a balanced diet with plenty of fresh fruits and vegetables, whole grains, and lean protein. If you have lost your appetite, eat small snacks rather than large meals. · Avoid drinking alcohol or using illegal drugs. Do not take medicines that have not been prescribed for you. They may interfere with medicines you may be taking for depression, or they may make your depression worse. · Take your medicines exactly as they are prescribed. You may start to feel better within 1 to 3 weeks of taking antidepressant medicine. But it can take as many as 6 to 8 weeks to see more improvement. If you have questions or concerns about your medicines, or if you do not notice any improvement by 3 weeks, talk to your doctor. · If you have any side effects from your medicine, tell your doctor. Antidepressants can make you feel tired, dizzy, or nervous. Some people have dry mouth, constipation, headaches, sexual problems, or diarrhea.  Many of these side effects are mild and will go away on their own after you have been taking the medicine for a few weeks. Some may last longer. Talk to your doctor if side effects are bothering you too much. You might be able to try a different medicine. · Get enough sleep. If you have problems sleeping: 
? Go to bed at the same time every night, and get up at the same time every morning. ? Keep your bedroom dark and quiet. ? Do not exercise after 5:00 p.m. ? Avoid drinks with caffeine after 5:00 p.m. · Avoid sleeping pills unless they are prescribed by the doctor treating your depression. Sleeping pills may make you groggy during the day, and they may interact with other medicine you are taking. · If you have any other illnesses, such as diabetes, heart disease, or high blood pressure, make sure to continue with your treatment. Tell your doctor about all of the medicines you take, including those with or without a prescription. · Keep the numbers for these national suicide hotlines: 1-371-347-TALK (0-318.660.2159) and 5-316-YJUHQYY (3-571.672.4191). If you or someone you know talks about suicide or feeling hopeless, get help right away. When should you call for help? Call 911 anytime you think you may need emergency care. For example, call if: 
  · You feel like hurting yourself or someone else.  
  · Someone you know has depression and is about to attempt or is attempting suicide.  
[facility] your doctor now or seek immediate medical care if: 
  · You hear voices.  
  · Someone you know has depression and: 
? Starts to give away his or her possessions. ? Uses illegal drugs or drinks alcohol heavily. ? Talks or writes about death, including writing suicide notes or talking about guns, knives, or pills. ? Starts to spend a lot of time alone. ? Acts very aggressively or suddenly appears calm.  
 Watch closely for changes in your health, and be sure to contact your doctor if: 
  · You do not get better as expected. Where can you learn more? Go to http://devendra-migue.info/. Enter S877 in the search box to learn more about \"Recovering From Depression: Care Instructions. \" Current as of: September 11, 2018 Content Version: 11.9 © 5758-4414 [employer]. Care instructions adapted under license by for; to (do) (which disclaims liability or warranty for this information). If you have questions about a medical condition or this instruction, always ask your healthcare professional. [place] [address] any warranty or liability for your use of this information. Well Visit, Women 48 to 72: Care Instructions Your Care Instructions Physical exams can help you stay healthy. Your doctor has checked your overall health and may have suggested ways to take good care of yourself. He or she also may have recommended tests. At home, you can help prevent illness with healthy eating, regular exercise, and other steps. Follow-up care is a key part of your treatment and safety. Be sure to make and go to all appointments, and call your doctor if you are having problems. It's also a good idea to know your test results and keep a list of the medicines you take. How can you care for yourself at home? · Reach and stay at a healthy weight. This will lower your risk for many problems, such as obesity, diabetes, heart disease, and high blood pressure. · Get at least 30 minutes of exercise on most days of the week. Walking is a good choice. You also may want to do other activities, such as running, swimming, cycling, or playing tennis or team sports. · Do not smoke. Smoking can make health problems worse. If you need help quitting, talk to your doctor about stop-smoking programs and medicines. These can increase your chances of quitting for good. · Protect your skin from too much sun.  When you're outdoors from 10 a.m. to 4 p.m., stay in the shade or cover up with clothing and a hat with a wide brim. Wear sunglasses that block UV rays. Even when it's cloudy, put broad-spectrum sunscreen (SPF 30 or higher) on any exposed skin. · See a dentist one or two times a year for checkups and to have your teeth cleaned. · Wear a seat belt in the car. · Limit alcohol to 1 drink a day. Too much alcohol can cause health problems. Follow your doctor's advice about when to have certain tests. These tests can spot problems early. · Cholesterol. Your doctor will tell you how often to have this done based on your age, family history, or other things that can increase your risk for heart attack and stroke. · Blood pressure. Have your blood pressure checked during a routine doctor visit. Your doctor will tell you how often to check your blood pressure based on your age, your blood pressure results, and other factors. · Mammogram. Ask your doctor how often you should have a mammogram, which is an X-ray of your breasts. A mammogram can spot breast cancer before it can be felt and when it is easiest to treat. · Pap test and pelvic exam. Ask your doctor how often you should have a Pap test. You may not need to have a Pap test as often as you used to. · Vision. Have your eyes checked every year or two or as often as your doctor suggests. Some experts recommend that you have yearly exams for glaucoma and other age-related eye problems starting at age 48. · Hearing. Tell your doctor if you notice any change in your hearing. You can have tests to find out how well you hear. · Diabetes. Ask your doctor whether you should have tests for diabetes. · Colon cancer. You should begin tests for colon cancer at age 48. You may have one of several tests. Your doctor will tell you how often to have tests based on your age and risk. Risks include whether you already had a precancerous polyp removed from your colon or whether your parents, sisters and brothers, or children have had colon cancer. · Thyroid disease. Talk to your doctor about whether to have your thyroid checked as part of a regular physical exam. Women have an increased chance of a thyroid problem. · Osteoporosis. You should begin tests for bone density at age 72. If you are younger than 72, ask your doctor whether you have factors that may increase your risk for this disease. You may want to have this test before age 72. · Heart attack and stroke risk. At least every 4 to 6 years, you should have your risk for heart attack and stroke assessed. Your doctor uses factors such as your age, blood pressure, cholesterol, and whether you smoke or have diabetes to show what your risk for a heart attack or stroke is over the next 10 years. When should you call for help? Watch closely for changes in your health, and be sure to contact your doctor if you have any problems or symptoms that concern you. Where can you learn more? Go to http://devendra-migue.info/. Enter M188 in the search box to learn more about \"Well Visit, Women 50 to 72: Care Instructions. \" Current as of: March 28, 2018 Content Version: 11.9 © 3572-5936 [employer]. Care instructions adapted under license by [employer] (which disclaims liability or warranty for this information). If you have questions about a medical condition or this instruction, always ask your healthcare professional. [place] [address] any warranty or liability for your use of this information.

## 2019-04-11 DIAGNOSIS — F33.42 RECURRENT MAJOR DEPRESSIVE DISORDER, IN FULL REMISSION (HCC): ICD-10-CM

## 2019-04-11 NOTE — TELEPHONE ENCOUNTER
Pt. is calling for a refill request for a 90-day supply on the following meds. Pharm on file verified.      LOV 01/23/2019  Last refill. 01/23/2019    Requested Prescriptions     Pending Prescriptions Disp Refills    buPROPion SR (WELLBUTRIN, ZYBAN) 200 mg SR tablet 180 Tab 3     Sig: TAKE 2 TABLETS DAILY FOR ANXIETY WITH DEPRESSION

## 2019-04-15 RX ORDER — BUPROPION HYDROCHLORIDE 200 MG/1
TABLET, EXTENDED RELEASE ORAL
Qty: 180 TAB | Refills: 0 | Status: SHIPPED | OUTPATIENT
Start: 2019-04-15 | End: 2019-06-20 | Stop reason: SDUPTHER

## 2019-06-20 DIAGNOSIS — F33.42 RECURRENT MAJOR DEPRESSIVE DISORDER, IN FULL REMISSION (HCC): ICD-10-CM

## 2019-06-20 RX ORDER — BUPROPION HYDROCHLORIDE 200 MG/1
TABLET, EXTENDED RELEASE ORAL
Qty: 180 TAB | Refills: 0 | Status: SHIPPED | OUTPATIENT
Start: 2019-06-20 | End: 2020-01-02 | Stop reason: SDUPTHER

## 2019-06-20 NOTE — TELEPHONE ENCOUNTER
Pt. is calling requesting a refill on the following meds, Pharm on file verified. Pt. Has an upcoming appt. On 06/24/2019 for meds refill. Pt. Jan Juarez she is going out of town and needs a refill [facility].      LOV 01/23/2019  Last refill  04/15/2019    Requested Prescriptions     Pending Prescriptions Disp Refills    buPROPion SR (WELLBUTRIN, ZYBAN) 200 mg SR tablet 180 Tab 0     Sig: TAKE 2 TABLETS DAILY FOR ANXIETY WITH DEPRESSION

## 2019-06-24 ENCOUNTER — OFFICE VISIT (OUTPATIENT)
Dept: FAMILY MEDICINE CLINIC | Age: 54
End: 2019-06-24

## 2019-06-24 VITALS
OXYGEN SATURATION: 98 % | SYSTOLIC BLOOD PRESSURE: 116 MMHG | HEART RATE: 63 BPM | WEIGHT: 136.6 LBS | TEMPERATURE: 98.3 F | BODY MASS INDEX: 21.44 KG/M2 | DIASTOLIC BLOOD PRESSURE: 78 MMHG | HEIGHT: 67 IN | RESPIRATION RATE: 15 BRPM

## 2019-06-24 DIAGNOSIS — Z12.39 SCREENING FOR MALIGNANT NEOPLASM OF BREAST: ICD-10-CM

## 2019-06-24 DIAGNOSIS — J30.89 PERENNIAL ALLERGIC RHINITIS: ICD-10-CM

## 2019-06-24 DIAGNOSIS — F33.0 DEPRESSION, MAJOR, RECURRENT, MILD (HCC): Primary | ICD-10-CM

## 2019-06-24 NOTE — PROGRESS NOTES
[facility] Note      Subjective:     Chief Complaint   Patient presents with    Depression     follow up and medication refill     Fortino Curiel is a 49y.o. year old female who presents for evaluation of the following:    Depression with Anxiety:   Chronic since 1997  Tx: Wellbutrin + xanax ( Rx by GYN for claustrophobia and sleep)  Previous Tx: None  Mood: \"pretty good\"  Counselor: None  STEVEN: 5>7>5  PHQ 5>11>6  3 most recent PHQ Screens 6/24/2019   Little interest or pleasure in doing things Not at all   Feeling down, depressed, irritable, or hopeless Not at all   Total Score PHQ 2 0   Trouble falling or staying asleep, or sleeping too much Several days   Feeling tired or having little energy Several days   Poor appetite, weight loss, or overeating Not at all   Feeling bad about yourself - or that you are a failure or have let yourself or your family down Not at all   Trouble concentrating on things such as school, work, reading, or watching TV Nearly every day   Moving or speaking so slowly that other people could have noticed; or the opposite being so fidgety that others notice Several days   Thoughts of being better off dead, or hurting yourself in some way Not at all   PHQ 9 Score 6   How difficult have these problems made it for you to do your work, take care of your home and get along with others -       Post Nasal Drip:   Has allergies  Clearing though  Tx: allegra  + allergy shots  Denies coughing, sore throat, fever      Care Team:   Dentist- Dr. Blade Mohamud in past 1 year  GYN- Dr. Bhupendra Guy  Cardiology: Dr. Rashida Burgos: [facility], Dr. Cortney Luo  GI: Dr. Cony Rueda:   Works at [employer]. 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 All other systems  reviewed are negative for a Comprehensive ROS (10+). Past Medical History:   Diagnosis Date    Depression 12/08    Menopause 2006    Paroxysmal atrial flutter [facility])     resolved with ablation 2/2010 dr orellana    Stroke [facility])         Social History     Socioeconomic History    Marital status:      Spouse name: Not on file    Number of children: Not on file    Years of education: Not on file    Highest education level: Not on file   Occupational History    Not on file   Social Needs    Financial resource strain: Not on file    Food insecurity:     Worry: Not on file     Inability: Not on file    Transportation needs:     Medical: Not on file     Non-medical: Not on file   Tobacco Use    Smoking status: Never Smoker    Smokeless tobacco: Never Used   Substance and Sexual Activity    Alcohol use: Yes     Alcohol/week: 1.5 oz     Types: 3 Standard drinks or equivalent per week     Comment: maybe 2-3 glasses wine/week    Drug use: Never    Sexual activity: Yes   Lifestyle    Physical activity:     Days per week: Not on file     Minutes per session: Not on file    Stress: Not on file   Relationships    Social connections:     Talks on phone: Not on file     Gets together: Not on file     Attends Synagogue service: Not on file     Active member of club or organization: Not on file     Attends meetings of clubs or organizations: Not on file     Relationship status: Not on file    Intimate partner violence:     Fear of current or ex partner: Not on file     Emotionally abused: Not on file     Physically abused: Not on file     Forced sexual activity: Not on file   Other Topics Concern    Not on file   Social History Narrative    Not on file       Current Outpatient Medications   Medication Sig    buPROPion SR (WELLBUTRIN, ZYBAN) 200 mg SR tablet TAKE 2 TABLETS DAILY FOR ANXIETY WITH DEPRESSION    Lactobacillus acidophilus (PROBIOTIC) 10 billion cell cap Take  by mouth.  fexofenadine (ALLEGRA) 180 mg tablet Take 1 Tab by mouth daily.     ALPRAZolam (XANAX) 0.25 mg tablet TAKE 1/2 TO 1 TABLET BY MOUTH TWICE A DAY AS NEEDED FOR ANXIETY (Patient taking differently: 0.5 mg. TAKE 1/2 TO 1 TABLET BY MOUTH TWICE A DAY AS NEEDED FOR ANXIETY)    raNITIdine (ZANTAC) 150 mg tablet Take 150 mg by mouth five (5) days a week. Indications: pt takes 150 mg in the morning 5/7 days of the week    naproxen sodium (ALEVE) 220 mg tablet Take 220 mg by mouth two (2) times daily as needed.  aspirin 81 mg chewable tablet Take 1 Tab by mouth daily. Can be obtained over-the-counter     No current facility-administered medications for this visit. Objective:     Vitals:    06/24/19 0808   BP: 116/78   Pulse: 63   Resp: 15   Temp: 98.3 °F (36.8 °C)   TempSrc: Oral   SpO2: 98%   Weight: 136 lb 9.6 oz (62 kg)   Height: 5' 7\" (1.702 m)       Physical Examination:  General: Alert, cooperative, no distress, appears stated age. Eyes: Conjunctivae clear. PERRL, EOMs intact. Ears: Normal external ear canals both ears. Nose: Nares normal.   Mouth/Throat: Lips, mucosa, and tongue normal.  Neck: Supple, symmetrical, trachea midline, no adenopathy. No thyroid enlargement/tenderness/nodules  Back: Symmetric, no curvature. ROM normal.   Lungs: Clear to auscultation bilaterally. Normal inspiratory and expiratory ratio. Heart: Regular rate and rhythm, S1, S2 normal, no murmur, click, rub or gallop. Abdomen: Soft, non-tender. Bowel sounds normal. No masses or organomegaly. Extremities: Extremities normal, atraumatic, no cyanosis or edema. Pulses: 2+ and symmetric all extremities. MSK: Gait steady and unassisted. Skin: No rash on exposed skin  Lymph nodes: Cervical, supraclavicular nodes normal.  Neurologic: CNII-XII intact. Strength 5/5 grossly. Sensation and reflexes normal throughout. No visits with results within 3 Month(s) from this visit.    Latest known visit with results is:   Office Visit on 01/23/2019   Component Date Value Ref Range Status    WBC 01/23/2019 4.5  3.4 - 10.8 x10E3/uL Final    RBC 01/23/2019 4.22  3.77 - 5.28 x10E6/uL Final    HGB 01/23/2019 14.1  11.1 - 15.9 g/dL Final    HCT 01/23/2019 41.9  34.0 - 46.6 % Final    MCV 01/23/2019 99* 79 - 97 fL Final    MCH 01/23/2019 33.4* 26.6 - 33.0 pg Final    MCHC 01/23/2019 33.7  31.5 - 35.7 g/dL Final    RDW 01/23/2019 12.7  12.3 - 15.4 % Final    PLATELET 37/72/3425 369  150 - 379 x10E3/uL Final    NEUTROPHILS 01/23/2019 57  Not Estab. % Final    Lymphocytes 01/23/2019 32  Not Estab. % Final    MONOCYTES 01/23/2019 9  Not Estab. % Final    EOSINOPHILS 01/23/2019 2  Not Estab. % Final    BASOPHILS 01/23/2019 0  Not Estab. % Final    ABS. NEUTROPHILS 01/23/2019 2.6  1.4 - 7.0 x10E3/uL Final    Abs Lymphocytes 01/23/2019 1.5  0.7 - 3.1 x10E3/uL Final    ABS. MONOCYTES 01/23/2019 0.4  0.1 - 0.9 x10E3/uL Final    ABS. EOSINOPHILS 01/23/2019 0.1  0.0 - 0.4 x10E3/uL Final    ABS. BASOPHILS 01/23/2019 0.0  0.0 - 0.2 x10E3/uL Final    IMMATURE GRANULOCYTES 01/23/2019 0  Not Estab. % Final    ABS. IMM. GRANS. 01/23/2019 0.0  0.0 - 0.1 x10E3/uL Final    Glucose 01/23/2019 101* 65 - 99 mg/dL Final    BUN 01/23/2019 11  6 - 24 mg/dL Final    Creatinine 01/23/2019 0.86  0.57 - 1.00 mg/dL Final    GFR est non-AA 01/23/2019 78  >59 mL/min/1.73 Final    GFR est AA 01/23/2019 90  >59 mL/min/1.73 Final    BUN/Creatinine ratio 01/23/2019 13  9 - 23 Final    Sodium 01/23/2019 144  134 - 144 mmol/L Final    Potassium 01/23/2019 4.2  3.5 - 5.2 mmol/L Final    Chloride 01/23/2019 101  96 - 106 mmol/L Final    CO2 01/23/2019 25  20 - 29 mmol/L Final    Calcium 01/23/2019 9.8  8.7 - 10.2 mg/dL Final    Protein, total 01/23/2019 7.2  6.0 - 8.5 g/dL Final    Albumin 01/23/2019 4.9  3.5 - 5.5 g/dL Final    GLOBULIN, TOTAL 01/23/2019 2.3  1.5 - 4.5 g/dL Final    A-G Ratio 01/23/2019 2.1  1.2 - 2.2 Final    Bilirubin, total 01/23/2019 0.3  0.0 - 1.2 mg/dL Final    Alk.  phosphatase 01/23/2019 61 39 - 117 IU/L Final    AST (SGOT) 01/23/2019 20  0 - 40 IU/L Final    ALT (SGPT) 01/23/2019 22  0 - 32 IU/L Final    Cholesterol, total 01/23/2019 186  100 - 199 mg/dL Final    Triglyceride 01/23/2019 56  0 - 149 mg/dL Final    HDL Cholesterol 01/23/2019 87  >39 mg/dL Final    VLDL, calculated 01/23/2019 11  5 - 40 mg/dL Final    LDL, calculated 01/23/2019 88  0 - 99 mg/dL Final    INTERPRETATION 01/23/2019 Note   Final    Supplemental report is available. Assessment/ Plan:   Diagnoses and all orders for this visit:    1. Depression, major, recurrent, mild ([facility] [address]    2. Perennial allergic rhinitis    3. Screening for malignant neoplasm of breast  -     [facility] MAMMO BI SCREENING INCL CAD; Future        Depression well controlled. Refilled wellbutrin. Encouraged counseling. Noted prescribed benzo by GYN- no refill from PCP. Mild allergic symptoms with post nasal drip. Trial of otc meds for symptom relief discussed and listed in patient instructions- nasal steroid + mucinex + antihistamine + sinus rinse + otc analgesia + humidifier prn    Screening mammo ordered. I have discussed the diagnosis with the patient and the intended plan as seen in the above orders. The patient has received an after-visit summary and questions were answered concerning future plans. I have discussed medication side effects and warnings with the patient as well. Follow-up and Dispositions    · Return in about 3 months (around 9/24/2019) for Follow Up.            Signed,    Parth Humphries MD  6/24/2019

## 2019-06-24 NOTE — PATIENT INSTRUCTIONS
For your symptoms: Your symptoms may improve with an oral antihistamine. These are available over the counter and include: 
Loratadine/claritin Cetirizine/Zyrtec Fexofenadine/Allegra Levocetirizine/Xyzal 
 
· Your symptoms may improve with a nasal steroid. These are available over the counter and include: · Flonase (aka fluticasone) · Nasocort (aka triamcinolone) · Nasonex (aka mometasone) · Rhinocort (aka budesonide) · Increase fluid intake, especially water to thin mucous and boost the immune system. · Avoid sugar and dairy while congested since they thicken mucous. · Get plenty of rest!   
· Gargle 3 times daily and as needed in Listerine or warm salt water vinegar solutions (1 tsp salt, 1 tsp vinegar in 1 cup lukewarm water.) · Use OTC nasal saline spray up each nostril four times daily. You could also consider using a netipot with distilled water. · Use humidifier at bedtime. · Use OTC Mucinex 600 mg twice daily to loosen mucous. · Use OTC Tylenol  (up to 650mg every 6 hours) or Ibuprofen (up to 800 mg every 8 hours) as needed for pain, fever or headaches. ·  Avoid decongestants and Ibuprofen if you have high blood pressure! Return to the doctor for evaluation: · If mucous is consistently discolored yellow or green throughout the day for more than a week · If you develop worsening facial pain · If you develop a fever that will not go away · If your symptoms worsen instead of improve Learning About Mindfulness for Stress What are mindfulness and stress? Stress is what you feel when you have to handle more than you are used to. A lot of things can cause stress. You may feel stress when you go on a job interview, take a test, or run a race. This kind of short-term stress is normal and even useful. It can help you if you need to work hard or react quickly. Stress also can last a long time.  Long-term stress is caused by stressful situations or events. Examples of long-term stress include long-term health problems, ongoing problems at work, and conflicts in your family. Long-term stress can harm your health. Mindfulness is a focus only on things happening in the present moment. It's a process of purposefully paying attention to and being aware of your surroundings, your emotions, your thoughts, and how your body feels. You are aware of these things, but you aren't judging these experiences as \"good\" or \"bad. \" Mindfulness can help you learn to calm your mind and body to help you cope with illness, pain, and stress. How does mindfulness help to relieve stress? Mindfulness can help quiet your mind and relax your body. Studies show that it can help some people sleep better, feel less anxious, and bring their blood pressure down. And it's been shown to help some people live and cope better with certain health problems like heart disease, depression, chronic pain, and cancer. How do you practice mindfulness? To be mindful is to pay attention, to be present, and to be accepting. · When you're mindful, you do just one thing and you pay close attention to that one thing. For example, you may sit quietly and notice your emotions or how your food tastes and smells. · When you're present, you focus on the things that are happening right now. You let go of your thoughts about the past and the future. When you dwell on the past or the future, you miss moments that can heal and strengthen you. You may miss moments like hearing a child laugh or seeing a friendly face when you think you're all alone. · When you're accepting, you don't  the present moment. Instead you accept your thoughts and feelings as they come. You can practice anytime, anywhere, and in any way you choose. You can practice in many ways. Here are a few ideas: · While doing your chores, like washing the dishes, let your mind focus on what's in your hand. What does the dish feel like? Is the water warm or cold? · Go outside and take a few deep breaths. What is the air like? Is it warm or cold? · When you can, take some time at the start of your day to sit alone and think. · Take a slow walk by yourself. Count your steps while you breathe in and out. · Try yoga breathing exercises, stretches, and poses to strengthen and relax your muscles. · At work, if you can, try to stop for a few moments each hour. Note how your body feels. Let yourself regroup and let your mind settle before you return to what you were doing. · If you struggle with anxiety or \"worry thoughts,\" imagine your mind as a blue martin and your worry thoughts as clouds. Now imagine those worry thoughts floating across your mind's martin. Just let them pass by as you watch. Follow-up care is a key part of your treatment and safety. Be sure to make and go to all appointments, and call your doctor if you are having problems. It's also a good idea to know your test results and keep a list of the medicines you take. Where can you learn more? Go to http://devendra-migue.info/. Enter I407 in the search box to learn more about \"Learning About Mindfulness for Stress. \" Current as of: June 28, 2018 Content Version: 11.9 © 1482-1489 [employer]. Care instructions adapted under license by [employer] (which disclaims liability or warranty for this information). If you have questions about a medical condition or this instruction, always ask your healthcare professional. [address] any warranty or liability for your use of this information.

## 2019-06-24 NOTE — PROGRESS NOTES
Chief Complaint   Patient presents with    Depression     follow up and medication refill     1. Have you been to the ER, urgent care clinic since your last visit? Hospitalized since your last visit? No    2. Have you seen or consulted any other health care providers outside of the [address] [place] [address] since your last visit? Include any pap smears or colon screening.  No

## 2019-08-12 ENCOUNTER — OFFICE VISIT (OUTPATIENT)
Dept: FAMILY MEDICINE CLINIC | Age: 54
End: 2019-08-12

## 2019-08-12 VITALS
DIASTOLIC BLOOD PRESSURE: 86 MMHG | TEMPERATURE: 97.8 F | RESPIRATION RATE: 17 BRPM | HEART RATE: 65 BPM | BODY MASS INDEX: 21.85 KG/M2 | OXYGEN SATURATION: 99 % | SYSTOLIC BLOOD PRESSURE: 124 MMHG | HEIGHT: 67 IN | WEIGHT: 139.2 LBS

## 2019-08-12 DIAGNOSIS — R21 SKIN RASH: ICD-10-CM

## 2019-08-12 DIAGNOSIS — B02.9 HERPES ZOSTER WITHOUT COMPLICATION: Primary | ICD-10-CM

## 2019-08-12 RX ORDER — VALACYCLOVIR HYDROCHLORIDE 1 G/1
1000 TABLET, FILM COATED ORAL 3 TIMES DAILY
Qty: 21 TAB | Refills: 0 | Status: SHIPPED | OUTPATIENT
Start: 2019-08-12 | End: 2019-08-19

## 2019-08-12 RX ORDER — IBUPROFEN 400 MG/1
TABLET ORAL
COMMUNITY
End: 2022-03-02

## 2019-08-12 NOTE — PROGRESS NOTES
Chief Complaint   Patient presents with    Shingles     allergist told her he think she has shingles 8/12/2019      1. Have you been to the ER, urgent care clinic since your last visit? Hospitalized since your last visit? No    2. Have you seen or consulted any other health care providers outside of the [address] [place] [address] since your last visit? Include any pap smears or colon screening.  No

## 2019-08-12 NOTE — PROGRESS NOTES
[facility] Note      Subjective:     Chief Complaint   Patient presents with    Shingles     allergist told her he think she has shingles 8/12/2019      Jimmy Valdez is a 49y.o. year old female who presents for evaluation of the following:      Rash on Right Chest:   Onset 7/2019  Occurred 1 week after returning from . [address], tingling  Tx: cotisone cream + ibuproifen with releif  No sick contacts  Denies discharge, fever          Review of Systems   Pertinent positives and negative per HPI. All other systems  reviewed are negative for a Comprehensive ROS (10+). Past Medical History:   Diagnosis Date    Depression 12/08    Menopause 2006    Paroxysmal atrial flutter [facility])     resolved with ablation 2/2010 dr orellana    Stroke [facility])         Social History     Socioeconomic History    Marital status:      Spouse name: Not on file    Number of children: Not on file    Years of education: Not on file    Highest education level: Not on file   Occupational History    Not on file   Social Needs    Financial resource strain: Not on file    Food insecurity:     Worry: Not on file     Inability: Not on file    Transportation needs:     Medical: Not on file     Non-medical: Not on file   Tobacco Use    Smoking status: Never Smoker    Smokeless tobacco: Never Used   Substance and Sexual Activity    Alcohol use:  Yes     Alcohol/week: 2.5 standard drinks     Types: 3 Standard drinks or equivalent per week     Comment: maybe 2-3 glasses wine/week    Drug use: Never    Sexual activity: Yes   Lifestyle    Physical activity:     Days per week: Not on file     Minutes per session: Not on file    Stress: Not on file   Relationships    Social connections:     Talks on phone: Not on file     Gets together: Not on file     Attends Synagogue service: Not on file     Active member of club or organization: Not on file     Attends meetings of clubs or organizations: Not on file     Relationship status: Not on file    Intimate partner violence:     Fear of current or ex partner: Not on file     Emotionally abused: Not on file     Physically abused: Not on file     Forced sexual activity: Not on file   Other Topics Concern    Not on file   Social History Narrative    Not on file       Family History   Problem Relation Age of Onset    Cancer Father         small cell lung    Heart Disease Father 71        in 45s, smoker    Heart Attack Father         quad bypass     Gout Father     Diabetes Father         ??/mild (later onset)     Alzheimer Maternal Grandmother     Cancer Paternal Grandfather         leukemia     Heart Disease Paternal Grandmother         older age related chf     Migraines Sister        Current Outpatient Medications   Medication Sig    ibuprofen (MOTRIN) 400 mg tablet Take  by mouth every six (6) hours as needed for Pain.  buPROPion SR (WELLBUTRIN, ZYBAN) 200 mg SR tablet TAKE 2 TABLETS DAILY FOR ANXIETY WITH DEPRESSION    Lactobacillus acidophilus (PROBIOTIC) 10 billion cell cap Take  by mouth.  fexofenadine (ALLEGRA) 180 mg tablet Take 1 Tab by mouth daily.  ALPRAZolam (XANAX) 0.25 mg tablet TAKE 1/2 TO 1 TABLET BY MOUTH TWICE A DAY AS NEEDED FOR ANXIETY (Patient taking differently: 0.5 mg. TAKE 1/2 TO 1 TABLET BY MOUTH TWICE A DAY AS NEEDED FOR ANXIETY)    raNITIdine (ZANTAC) 150 mg tablet Take 150 mg by mouth five (5) days a week. Indications: pt takes 150 mg in the morning 5/7 days of the week    naproxen sodium (ALEVE) 220 mg tablet Take 220 mg by mouth two (2) times daily as needed.  aspirin 81 mg chewable tablet Take 1 Tab by mouth daily. Can be obtained over-the-counter     No current facility-administered medications for this visit.               Objective:     Vitals:    08/12/19 1030   BP: 124/86   Pulse: 65   Resp: 17   Temp: 97.8 °F (36.6 °C)   TempSrc: Oral   SpO2: 99%   Weight: 139 lb 3.2 oz (63.1 kg) Height: 5' 7\" (1.702 m)       Physical Examination:  General: Alert, cooperative, no distress, appears stated age. Eyes: Conjunctivae clear. PERRL, EOMs intact. Ears: Normal external ear canals both ears. Nose: Nares normal.   Mouth/Throat: Lips, mucosa, and tongue normal. No oropharyngeal erythema. No tonsillar enlargement or exudate. Neck: Supple, symmetrical, trachea midline, no adenopathy. No thyroid enlargement/tenderness/nodules  Respiratory: Breathing comfortably, in no acute respiratory distress. Clear to auscultation bilaterally. Normal inspiratory and expiratory ratio. Cardiovascular: Regular rate and rhythm, S1, S2 normal, no murmur, click, rub or gallop.   -Extremities no edema. Pulses 2+ and symmetric radial   Abdomen: Soft, non-tender, not distended. Bowel sounds normal  MSK: Extremities normal, atraumatic, no effusion. Gait steady and unassisted. Skin: Skin color, texture, turgor normal.   - Right anterior chest with vesicular rash with erythematous base- multiple localized ~2mm vesicle. T4 dermatome. Tender. Imaged below   Lymph nodes: Cervical, supraclavicular nodes normal.  Neurologic: CNII-XII intact. Strength 5/5 grossly. Sensation and reflexes normal throughout. Psychiatric: Affect appropriate. Mood euthymic. Thoughts logical. Speech volume and speed normal                  No visits with results within 3 Month(s) from this visit.    Latest known visit with results is:   Office Visit on 01/23/2019   Component Date Value Ref Range Status    WBC 01/23/2019 4.5  3.4 - 10.8 x10E3/uL Final    RBC 01/23/2019 4.22  3.77 - 5.28 x10E6/uL Final    HGB 01/23/2019 14.1  11.1 - 15.9 g/dL Final    HCT 01/23/2019 41.9  34.0 - 46.6 % Final    MCV 01/23/2019 99* 79 - 97 fL Final    MCH 01/23/2019 33.4* 26.6 - 33.0 pg Final    MCHC 01/23/2019 33.7  31.5 - 35.7 g/dL Final    RDW 01/23/2019 12.7  12.3 - 15.4 % Final    PLATELET 57/20/9035 602  150 - 379 x10E3/uL Final    NEUTROPHILS 01/23/2019 57  Not Estab. % Final    Lymphocytes 01/23/2019 32  Not Estab. % Final    MONOCYTES 01/23/2019 9  Not Estab. % Final    EOSINOPHILS 01/23/2019 2  Not Estab. % Final    BASOPHILS 01/23/2019 0  Not Estab. % Final    ABS. NEUTROPHILS 01/23/2019 2.6  1.4 - 7.0 x10E3/uL Final    Abs Lymphocytes 01/23/2019 1.5  0.7 - 3.1 x10E3/uL Final    ABS. MONOCYTES 01/23/2019 0.4  0.1 - 0.9 x10E3/uL Final    ABS. EOSINOPHILS 01/23/2019 0.1  0.0 - 0.4 x10E3/uL Final    ABS. BASOPHILS 01/23/2019 0.0  0.0 - 0.2 x10E3/uL Final    IMMATURE GRANULOCYTES 01/23/2019 0  Not Estab. % Final    ABS. IMM. GRANS. 01/23/2019 0.0  0.0 - 0.1 x10E3/uL Final    Glucose 01/23/2019 101* 65 - 99 mg/dL Final    BUN 01/23/2019 11  6 - 24 mg/dL Final    Creatinine 01/23/2019 0.86  0.57 - 1.00 mg/dL Final    GFR est non-AA 01/23/2019 78  >59 mL/min/1.73 Final    GFR est AA 01/23/2019 90  >59 mL/min/1.73 Final    BUN/Creatinine ratio 01/23/2019 13  9 - 23 Final    Sodium 01/23/2019 144  134 - 144 mmol/L Final    Potassium 01/23/2019 4.2  3.5 - 5.2 mmol/L Final    Chloride 01/23/2019 101  96 - 106 mmol/L Final    CO2 01/23/2019 25  20 - 29 mmol/L Final    Calcium 01/23/2019 9.8  8.7 - 10.2 mg/dL Final    Protein, total 01/23/2019 7.2  6.0 - 8.5 g/dL Final    Albumin 01/23/2019 4.9  3.5 - 5.5 g/dL Final    GLOBULIN, TOTAL 01/23/2019 2.3  1.5 - 4.5 g/dL Final    A-G Ratio 01/23/2019 2.1  1.2 - 2.2 Final    Bilirubin, total 01/23/2019 0.3  0.0 - 1.2 mg/dL Final    Alk.  phosphatase 01/23/2019 61  39 - 117 IU/L Final    AST (SGOT) 01/23/2019 20  0 - 40 IU/L Final    ALT (SGPT) 01/23/2019 22  0 - 32 IU/L Final    Cholesterol, total 01/23/2019 186  100 - 199 mg/dL Final    Triglyceride 01/23/2019 56  0 - 149 mg/dL Final    HDL Cholesterol 01/23/2019 87  >39 mg/dL Final    VLDL, calculated 01/23/2019 11  5 - 40 mg/dL Final    LDL, calculated 01/23/2019 88  0 - 99 mg/dL Final    INTERPRETATION 01/23/2019 Note   Final    Supplemental report is available. Assessment/ Plan:   Diagnoses and all orders for this visit:    1. Herpes zoster without complication  -     valACYclovir (VALTREX) 1 gram tablet; Take 1 Tab by mouth three (3) times daily for 7 days. 2. Skin rash        Antiviral for shingles type rash. If not improved consider dermatology referral.     Educated patient on red flag symptoms to warrant return to clinic or emergency room visit. I have discussed the diagnosis with the patient and the intended plan as seen in the above orders. The patient has been offered or received an after-visit summary and questions were answered concerning future plans. I have discussed medication side effects and warnings with the patient as well. Follow-up and Dispositions    · Return if symptoms worsen or fail to improve.            Signed,    Bharat Infante MD  8/12/2019

## 2019-08-12 NOTE — PATIENT INSTRUCTIONS

## 2020-01-02 DIAGNOSIS — F33.42 RECURRENT MAJOR DEPRESSIVE DISORDER, IN FULL REMISSION (HCC): ICD-10-CM

## 2020-01-02 NOTE — TELEPHONE ENCOUNTER
----- Message from Hannah Overton sent at 1/2/2020  2:47 PM EST -----  Regarding: Dr. Rishabh Ying refill  Medication Refill    Caller (if not patient):      Relationship of caller (if not patient):      Best contact number(s): [phone]      Name of medication and dosage if known: buPROPion SR (Bambi Sleight) 200 mg       Is patient out of this medication (yes/no): no       [facility]     Pharmacy listed in chart? (yes/no): yes   Pharmacy phone number:      Details to clarify the request:      Hannah Overton      .   Requested Prescriptions     Pending Prescriptions Disp Refills    buPROPion SR (WELLBUTRIN, ZYBAN) 200 mg SR tablet 180 Tab 0     Sig: TAKE 2 TABLETS DAILY FOR ANXIETY WITH DEPRESSION

## 2020-01-03 RX ORDER — BUPROPION HYDROCHLORIDE 200 MG/1
TABLET, EXTENDED RELEASE ORAL
Qty: 180 TAB | Refills: 0 | Status: SHIPPED | OUTPATIENT
Start: 2020-01-03 | End: 2020-01-20 | Stop reason: SDUPTHER

## 2020-01-20 ENCOUNTER — TELEPHONE (OUTPATIENT)
Dept: FAMILY MEDICINE CLINIC | Age: 55
End: 2020-01-20

## 2020-01-20 DIAGNOSIS — F33.42 RECURRENT MAJOR DEPRESSIVE DISORDER, IN FULL REMISSION (HCC): ICD-10-CM

## 2020-01-20 RX ORDER — BUPROPION HYDROCHLORIDE 200 MG/1
TABLET, EXTENDED RELEASE ORAL
Qty: 180 TAB | Refills: 0 | Status: SHIPPED | OUTPATIENT
Start: 2020-01-20 | End: 2020-03-31

## 2020-01-20 NOTE — TELEPHONE ENCOUNTER
Outbound call to patient. Name and  verified.  Left message for patient that I was returning her call

## 2020-01-20 NOTE — TELEPHONE ENCOUNTER
----- Message from Leann Leach sent at 1/20/2020  9:29 AM EST -----  Regarding: Dr. Silvia Sabillon Message/Vendor Calls    Caller's first and last name: Patient     Reason for call:Patient is requesting to speak with front office in regards to a discrepancy in regards to her Wellbutrin prescription.        Callback required yes/no and why: Yes       Best contact number(s): [phone]      Details to clarify the request:       Leann Leach

## 2020-01-20 NOTE — TELEPHONE ENCOUNTER
Inbound call from patient spoke with PSR> patient inquired about medication that she requested on 1/2/20 stating that she has not received it. Medication was sent to [facility] on 1/3/20. Reviewed chart medication confirmation failed.  Resent medication with confirmation

## 2020-06-27 DIAGNOSIS — Z12.31 ENCOUNTER FOR SCREENING MAMMOGRAM FOR MALIGNANT NEOPLASM OF BREAST: Primary | ICD-10-CM

## 2020-08-24 ENCOUNTER — TELEPHONE (OUTPATIENT)
Dept: FAMILY MEDICINE CLINIC | Age: 55
End: 2020-08-24

## 2020-08-24 NOTE — TELEPHONE ENCOUNTER
MD Chano Yoder,    Patient call requesting refill of bupropion and does not want to come to the office. Please advise.   Requesting callback: [phone] Thanks, Franko Tang

## 2020-08-24 NOTE — TELEPHONE ENCOUNTER
Call to schedule in office visit    MD Note: Discussed with patient via telephone and explained annual visit with MD needed for further refills. Patient will call to schedule in office appointment then if needed will authorize 1 week supply of medication.

## 2020-08-28 ENCOUNTER — OFFICE VISIT (OUTPATIENT)
Dept: FAMILY MEDICINE CLINIC | Age: 55
End: 2020-08-28
Payer: COMMERCIAL

## 2020-08-28 VITALS
TEMPERATURE: 98.2 F | HEIGHT: 68 IN | HEART RATE: 72 BPM | RESPIRATION RATE: 16 BRPM | SYSTOLIC BLOOD PRESSURE: 109 MMHG | BODY MASS INDEX: 20.76 KG/M2 | OXYGEN SATURATION: 99 % | DIASTOLIC BLOOD PRESSURE: 80 MMHG | WEIGHT: 137 LBS

## 2020-08-28 DIAGNOSIS — Z23 ENCOUNTER FOR IMMUNIZATION: ICD-10-CM

## 2020-08-28 DIAGNOSIS — F33.42 RECURRENT MAJOR DEPRESSIVE DISORDER, IN FULL REMISSION (HCC): ICD-10-CM

## 2020-08-28 DIAGNOSIS — Z00.00 WELL WOMAN EXAM (NO GYNECOLOGICAL EXAM): Primary | ICD-10-CM

## 2020-08-28 PROCEDURE — 90750 HZV VACC RECOMBINANT IM: CPT | Performed by: FAMILY MEDICINE

## 2020-08-28 PROCEDURE — 99396 PREV VISIT EST AGE 40-64: CPT | Performed by: FAMILY MEDICINE

## 2020-08-28 RX ORDER — FAMOTIDINE 20 MG/1
20 TABLET, FILM COATED ORAL DAILY
COMMUNITY

## 2020-08-28 RX ORDER — BUPROPION HYDROCHLORIDE 200 MG/1
TABLET, EXTENDED RELEASE ORAL
Qty: 180 TAB | Refills: 3 | Status: SHIPPED | OUTPATIENT
Start: 2020-08-28 | End: 2022-09-20 | Stop reason: SDUPTHER

## 2020-08-28 NOTE — PROGRESS NOTES
[facility] Note      Subjective:     Chief Complaint   Patient presents with    Complete Physical    Medication Refill     refill of wellbutrin and also needs refill of xanax     Christo Merritt is a 48y.o. year old female who presents for evaluation of the following:      Depression:   Chronic since [address]             buPROPion SR (WELLBUTRIN, ZYBAN) 200 mg SR tablet (Taking) TAKE 2 TABLETS DAILY FOR ANXIETY WITH DEPRESSION.  Office Visit due by 8/2020 for further refills      - needs refill  Mood: \"under stress\"   Therapist: None  STEVEN: 5>7>11  PHQ 5>11>11  3 most recent PHQ Screens 8/28/2020   Little interest or pleasure in doing things Several days   Feeling down, depressed, irritable, or hopeless Not at all   Total Score PHQ 2 1   Trouble falling or staying asleep, or sleeping too much More than half the days   Feeling tired or having little energy Several days   Poor appetite, weight loss, or overeating Several days   Feeling bad about yourself - or that you are a failure or have let yourself or your family down Several days   Trouble concentrating on things such as school, work, reading, or watching TV Nearly every day   Moving or speaking so slowly that other people could have noticed; or the opposite being so fidgety that others notice More than half the days   Thoughts of being better off dead, or hurting yourself in some way Not at all   PHQ 9 Score 11   How difficult have these problems made it for you to do your work, take care of your home and get along with others -       Health Maintenance:   Health Maintenance   Topic Date Due    Shingrix Vaccine Age 49> (1 of 2) 03/17/2016    Breast Cancer Screen Mammogram  08/04/2019    Lipid Screen  01/23/2024    DTaP/Tdap/Td series (2 - Td) 07/20/2025    Colonoscopy  07/12/2027    Pneumococcal 0-64 years  Aged Out     HIV or other STD testing: Declined  Domestic Violence Screen:   Abuse Screening Questionnaire 2019   Do you ever feel afraid of your partner? N   Are you in a relationship with someone who physically or mentally threatens you? N   Is it safe for you to go home? Y     Smoker? No      Pap:  Last with GYN   Mammogram: Last   No LMP recorded. Patient has had a hysterectomy. reports being sexually active. Care Team:   Dentist- Dr. Jacqueline Jarvis in past 1 year  GYN- Dr. Trammell Medicus  Cardiology: Dr. Emeka Hooker  Allergist: Rajat Allergy  GI: Dr. Grover Mckeon:   Works at [employer]. 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 Jacqueline Jarvis in past 1 year  GYN- Dr. Kaylin Chang  Cardiology: Dr. Emeka Hooker  Allergist: Rajat Allergy  GI: Dr. Galvez [place]      Review of Systems   Pertinent positives and negative per HPI. All other systems  reviewed are negative for a Comprehensive ROS (10+). Past Medical History:   Diagnosis Date    Depression     Menopause     Paroxysmal atrial flutter [facility])     resolved with ablation 2010 dr orellana    Stroke [facility])         Social History     Socioeconomic History    Marital status:      Spouse name: Not on file    Number of children: Not on file    Years of education: Not on file    Highest education level: Not on file   Occupational History    Not on file   Social Needs    Financial resource strain: Not on file    Food insecurity     Worry: Not on file     Inability: Not on file   [employer] needs     Medical: Not on file     Non-medical: Not on file   Tobacco Use    Smoking status: Never Smoker    Smokeless tobacco: Never Used   Substance and Sexual Activity    Alcohol use:  Yes     Alcohol/week: 2.5 standard drinks     Types: 3 Standard drinks or equivalent per week     Comment: maybe 2-3 glasses wine/week    Drug use: Never    Sexual activity: Yes   Lifestyle    Physical activity     Days per week: Not on file     Minutes per session: Not on file    Stress: Not on file   Relationships    Social connections     Talks on phone: Not on file     Gets together: Not on file     Attends [employer] service: Not on file     Active member of club or organization: Not on file     Attends meetings of clubs or organizations: Not on file     Relationship status: Not on file    Intimate partner violence     Fear of current or ex partner: Not on file     Emotionally abused: Not on file     Physically abused: Not on file     Forced sexual activity: Not on file   Other Topics Concern    Not on file   Social History Narrative    Not on file       Current Outpatient Medications   Medication Sig    famotidine (PEPCID) 20 mg tablet Take 20 mg by mouth daily.  buPROPion SR (WELLBUTRIN, ZYBAN) 200 mg SR tablet TAKE 2 TABLETS DAILY FOR ANXIETY WITH DEPRESSION. Office Visit due by 8/2020 for further refills    ibuprofen (MOTRIN) 400 mg tablet Take  by mouth every six (6) hours as needed for Pain.  Lactobacillus acidophilus (PROBIOTIC) 10 billion cell cap Take  by mouth.  fexofenadine (ALLEGRA) 180 mg tablet Take 1 Tab by mouth daily.  ALPRAZolam (XANAX) 0.25 mg tablet TAKE 1/2 TO 1 TABLET BY MOUTH TWICE A DAY AS NEEDED FOR ANXIETY (Patient taking differently: 0.5 mg. TAKE 1/2 TO 1 TABLET BY MOUTH TWICE A DAY AS NEEDED FOR ANXIETY)    naproxen sodium (ALEVE) 220 mg tablet Take 220 mg by mouth two (2) times daily as needed.  aspirin 81 mg chewable tablet Take 1 Tab by mouth daily. Can be obtained over-the-counter    raNITIdine (ZANTAC) 150 mg tablet Take 150 mg by mouth five (5) days a week. Indications: pt takes 150 mg in the morning 5/7 days of the week     No current facility-administered medications for this visit.           Objective:     Vitals:    08/28/20 1038   BP: 109/80   Pulse: 72   Resp: 16   Temp: 98.2 °F (36.8 °C)   TempSrc: Oral   SpO2: 99%   Weight: 137 lb (62.1 kg)   Height: 5' 8\" (1.727 m)       Physical Examination:  General: Alert, cooperative, no distress, appears stated age. Eyes: Conjunctivae clear. PERRL, EOMs intact. Ears: Normal external ear canals both ears. Nose: Nares normal.   Mouth/Throat: Lips, mucosa, and tongue normal.  Neck: Supple, symmetrical, trachea midline, no adenopathy. No thyroid enlargement/tenderness/nodules  Back: Symmetric, no curvature. ROM normal.  - left lumbar paraspinal muscle tenderness  - Negative straight leg raise   Lungs: Clear to auscultation bilaterally. Normal inspiratory and expiratory ratio. Heart: Regular rate and rhythm, S1, S2 normal, no murmur, click, rub or gallop. Abdomen: Soft, non-tender. Bowel sounds normal. No masses or organomegaly. Extremities: Extremities normal, atraumatic, no cyanosis or edema. Pulses: 2+ and symmetric all extremities. MSK: gait steady and unassisted. Skin: No rash on exposed skin  Lymph nodes: Cervical, supraclavicular nodes normal.  Neurologic: CNII-XII intact. Strength 5/5 grossly. Sensation and reflexes normal throughout. No visits with results within 3 Month(s) from this visit. Latest known visit with results is:   Office Visit on 01/23/2019   Component Date Value Ref Range Status    WBC 01/23/2019 4.5  3.4 - 10.8 x10E3/uL Final    RBC 01/23/2019 4.22  3.77 - 5.28 x10E6/uL Final    HGB 01/23/2019 14.1  11.1 - 15.9 g/dL Final    HCT 01/23/2019 41.9  34.0 - 46.6 % Final    MCV 01/23/2019 99* 79 - 97 fL Final    MCH 01/23/2019 33.4* 26.6 - 33.0 pg Final    MCHC 01/23/2019 33.7  31.5 - 35.7 g/dL Final    RDW 01/23/2019 12.7  12.3 - 15.4 % Final    PLATELET 94/77/1757 796  150 - 379 x10E3/uL Final    NEUTROPHILS 01/23/2019 57  Not Estab. % Final    Lymphocytes 01/23/2019 32  Not Estab. % Final    MONOCYTES 01/23/2019 9  Not Estab. % Final    EOSINOPHILS 01/23/2019 2  Not Estab. % Final    BASOPHILS 01/23/2019 0  Not Estab. % Final    ABS.  NEUTROPHILS 01/23/2019 2.6  1.4 - 7.0 x10E3/uL Final    Abs Lymphocytes 01/23/2019 1.5  0.7 - 3.1 x10E3/uL Final    ABS. MONOCYTES 01/23/2019 0.4  0.1 - 0.9 x10E3/uL Final    ABS. EOSINOPHILS 01/23/2019 0.1  0.0 - 0.4 x10E3/uL Final    ABS. BASOPHILS 01/23/2019 0.0  0.0 - 0.2 x10E3/uL Final    IMMATURE GRANULOCYTES 01/23/2019 0  Not Estab. % Final    ABS. IMM. GRANS. 01/23/2019 0.0  0.0 - 0.1 x10E3/uL Final    Glucose 01/23/2019 101* 65 - 99 mg/dL Final    BUN 01/23/2019 11  6 - 24 mg/dL Final    Creatinine 01/23/2019 0.86  0.57 - 1.00 mg/dL Final    GFR est non-AA 01/23/2019 78  >59 mL/min/1.73 Final    GFR est AA 01/23/2019 90  >59 mL/min/1.73 Final    BUN/Creatinine ratio 01/23/2019 13  9 - 23 Final    Sodium 01/23/2019 144  134 - 144 mmol/L Final    Potassium 01/23/2019 4.2  3.5 - 5.2 mmol/L Final    Chloride 01/23/2019 101  96 - 106 mmol/L Final    CO2 01/23/2019 25  20 - 29 mmol/L Final    Calcium 01/23/2019 9.8  8.7 - 10.2 mg/dL Final    Protein, total 01/23/2019 7.2  6.0 - 8.5 g/dL Final    Albumin 01/23/2019 4.9  3.5 - 5.5 g/dL Final    GLOBULIN, TOTAL 01/23/2019 2.3  1.5 - 4.5 g/dL Final    A-G Ratio 01/23/2019 2.1  1.2 - 2.2 Final    Bilirubin, total 01/23/2019 0.3  0.0 - 1.2 mg/dL Final    Alk. phosphatase 01/23/2019 61  39 - 117 IU/L Final    AST (SGOT) 01/23/2019 20  0 - 40 IU/L Final    ALT (SGPT) 01/23/2019 22  0 - 32 IU/L Final    Cholesterol, total 01/23/2019 186  100 - 199 mg/dL Final    Triglyceride 01/23/2019 56  0 - 149 mg/dL Final    HDL Cholesterol 01/23/2019 87  >39 mg/dL Final    VLDL, calculated 01/23/2019 11  5 - 40 mg/dL Final    LDL, calculated 01/23/2019 88  0 - 99 mg/dL Final    INTERPRETATION 01/23/2019 Note   Final    Supplemental report is available. Assessment/ Plan:   Diagnoses and all orders for this visit:    1. Well woman exam (no gynecological exam)  -     METABOLIC PANEL, COMPREHENSIVE  -     LIPID PANEL    2.  Recurrent major depressive disorder, in full remission ([facility] [address]  -     buPROPion SR ([facility] ZYBAN) 200 mg SR tablet; TAKE 2 TABLETS DAILY FOR ANXIETY WITH DEPRESSION. Office Visit due by 8/2020 for further refills    3. Encounter for immunization  -     [place] IMMUNIZ ADMIN,1 SINGLE/COMB VAC/TOXOID  -     ZOSTER VACC RECOMBINANT ADJUVANTED      For today's visit, I did the following:  · Reviewed PMH as listed in orders  · Refilled meds for chronic conditions, per orders  · Reviewed labs in detail or ordered lab  Labs to eval end organ function and etiology of chronic/acute concerns. Relevant vaccine, cancer screening and other health maintenance reviewed and updated per orders. -STI screen declined. -Pap not due 2/2 hysterectomy. Negative depression screen. Follow up with specialists per routine. Depression well controlled. Refilled wellbutrin. Encouraged counseling. I have discussed the diagnosis with the patient and the intended plan as seen in the above orders. The patient has received an after-visit summary and questions were answered concerning future plans. I have discussed medication side effects and warnings with the patient as well. Follow-up and Dispositions    · Return in about 1 year (around 8/28/2021) for Follow Up.        Signed,    Domonique Donaldson MD  8/28/2020

## 2020-08-28 NOTE — PROGRESS NOTES
Identified pt with two pt identifiers(name and ). Reviewed record in preparation for visit and have obtained necessary documentation. Chief Complaint   Patient presents with    Complete Physical    Medication Refill     refill of wellbutrin and also needs refill of xanax        Health Maintenance Due   Topic    Shingrix Vaccine Age 50> (1 of 2)    Breast Cancer Screen Mammogram         Visit Vitals  /80 (BP 1 Location: Right arm, BP Patient Position: Sitting)   Pulse 72   Temp 98.2 °F (36.8 °C) (Oral)   Resp 16   Ht 5' 8\" (1.727 m)   Wt 137 lb (62.1 kg)   SpO2 99%   BMI 20.83 kg/m²     Pain Scale: 0 - No pain/10    Coordination of Care Questionnaire:  :   1. Have you been to the ER, urgent care clinic since your last visit? Hospitalized since your last visit? [facility]    2. Have you seen or consulted any other health care providers outside of the [address] [place] [address] since your last visit? Include any pap smears or colon screening.  Yes

## 2020-08-28 NOTE — PATIENT INSTRUCTIONS
Follow up with a behavioral therapist for evaluation and management of your mood. If you do not already see a therapist, you can find a list through [employer] by calling the mental help line number on the back of your insurance card. Here are a few local providers: Huy Moreno [phone]  Saundra Nevarez [phone]  [facility]: Isabella Brown[address] [facility]  [phone] (can prescribe medications)  [address] [phone] (can prescribe medications)  0 [address] [place] [address] [phone] (can prescribe medications)  [facility]         A Healthy Lifestyle: Care Instructions  Your Care Instructions     A healthy lifestyle can help you feel good, stay at a healthy weight, and have plenty of energy for both work and play. A healthy lifestyle is something you can share with your whole family. A healthy lifestyle also can lower your risk for serious health problems, such as high blood pressure, heart disease, and diabetes. You can follow a few steps listed below to improve your health and the health of your family. Follow-up care is a key part of your treatment and safety. Be sure to make and go to all appointments, and call your doctor if you are having problems. It's also a good idea to know your test results and keep a list of the medicines you take. How can you care for yourself at home? · Do not eat too much sugar, fat, or fast foods. You can still have dessert and treats now and then. The goal is moderation. · Start small to improve your eating habits. Pay attention to portion sizes, drink less juice and soda pop, and eat more fruits and vegetables. ? Eat a healthy amount of food. A 3-ounce serving of meat, for example, is about the size of a deck of cards. Fill the rest of your plate with vegetables and whole grains. ? Limit the amount of soda and sports drinks you have every day. Drink more water when you are thirsty.   ? Eat at least 5 servings of fruits and vegetables every day. It may seem like a lot, but it is not hard to reach this goal. A serving or helping is 1 piece of fruit, 1 cup of vegetables, or 2 cups of leafy, raw vegetables. Have an apple or some carrot sticks as an afternoon snack instead of a candy bar. Try to have fruits and/or vegetables at every meal.  · Make exercise part of your daily routine. You may want to start with simple activities, such as walking, bicycling, or slow swimming. Try to be active 30 to 60 minutes every day. You do not need to do all 30 to 60 minutes all at once. For example, you can exercise 3 times a day for 10 or 20 minutes. Moderate exercise is safe for most people, but it is always a good idea to talk to your doctor before starting an exercise program.  · Keep moving. [employer] the lawn, work in the garden, or [employer]. Take the stairs instead of the elevator at work. · If you smoke, quit. People who smoke have an increased risk for heart attack, stroke, cancer, and other lung illnesses. Quitting is hard, but there are ways to boost your chance of quitting tobacco for good. ? Use nicotine gum, patches, or lozenges. ? Ask your doctor about stop-smoking programs and medicines. ? Keep trying. In addition to reducing your risk of diseases in the future, you will notice some benefits soon after you stop using tobacco. If you have shortness of breath or asthma symptoms, they will likely get better within a few weeks after you quit. · Limit how much alcohol you drink. Moderate amounts of alcohol (up to 2 drinks a day for men, 1 drink a day for women) are okay. But drinking too much can lead to liver problems, high blood pressure, and other health problems. Family health  If you have a family, there are many things you can do together to improve your health. · Eat meals together as a family as often as possible. · Eat healthy foods. This includes fruits, vegetables, lean meats and dairy, and whole grains.   · Include your family in your fitness plan. Most people think of activities such as jogging or tennis as the way to fitness, but there are many ways you and your family can be more active. Anything that makes you breathe hard and gets your heart pumping is exercise. Here are some tips:  ? Walk to do errands or to take your child to school or the bus.  ? Go for a family bike ride after dinner instead of watching TV. Where can you learn more? Go to http://devendra-migue.info/  Enter D520 in the search box to learn more about \"A Healthy Lifestyle: Care Instructions. \"  Current as of: January 31, 2020               Content Version: 12.5  © 2006-2020 [employer]. Care instructions adapted under license by [employer] (which disclaims liability or warranty for this information). If you have questions about a medical condition or this instruction, always ask your healthcare professional. [address] any warranty or liability for your use of this information.

## 2020-08-31 LAB
ALBUMIN SERPL-MCNC: 4.5 G/DL (ref 3.8–4.9)
ALBUMIN/GLOB SERPL: 1.9 {RATIO} (ref 1.2–2.2)
ALP SERPL-CCNC: 67 IU/L (ref 39–117)
ALT SERPL-CCNC: 22 IU/L (ref 0–32)
AST SERPL-CCNC: 24 IU/L (ref 0–40)
BILIRUB SERPL-MCNC: 0.5 MG/DL (ref 0–1.2)
BUN SERPL-MCNC: 13 MG/DL (ref 6–24)
BUN/CREAT SERPL: 15 (ref 9–23)
CALCIUM SERPL-MCNC: 9.6 MG/DL (ref 8.7–10.2)
CHLORIDE SERPL-SCNC: 106 MMOL/L (ref 96–106)
CHOLEST SERPL-MCNC: 203 MG/DL (ref 100–199)
CO2 SERPL-SCNC: 26 MMOL/L (ref 20–29)
CREAT SERPL-MCNC: 0.88 MG/DL (ref 0.57–1)
GLOBULIN SER CALC-MCNC: 2.4 G/DL (ref 1.5–4.5)
GLUCOSE SERPL-MCNC: 87 MG/DL (ref 65–99)
HDLC SERPL-MCNC: 86 MG/DL
INTERPRETATION, 910389: NORMAL
LDLC SERPL CALC-MCNC: 107 MG/DL (ref 0–99)
POTASSIUM SERPL-SCNC: 4.4 MMOL/L (ref 3.5–5.2)
PROT SERPL-MCNC: 6.9 G/DL (ref 6–8.5)
SODIUM SERPL-SCNC: 143 MMOL/L (ref 134–144)
SPECIMEN STATUS REPORT, ROLRST: NORMAL
TRIGL SERPL-MCNC: 50 MG/DL (ref 0–149)
VLDLC SERPL CALC-MCNC: 10 MG/DL (ref 5–40)

## 2020-09-03 NOTE — PROGRESS NOTES
Most of your test results are normal.  Your cholesterol was higher than normal.  Try to avoid foods high in saturated fats such as processed meats, fried foods, and greasy snacks. Weight loss will also help with this.  Mychart comment sent    MD Note: ASCVD Risk 0.9%

## 2021-07-27 ENCOUNTER — HOSPITAL ENCOUNTER (OUTPATIENT)
Dept: MAMMOGRAPHY | Age: 56
Discharge: HOME | End: 2021-07-27
Payer: COMMERCIAL

## 2021-07-27 DIAGNOSIS — Z12.31 ENCOUNTER FOR SCREENING MAMMOGRAM FOR MALIGNANT NEOPLASM OF BREAST: ICD-10-CM

## 2021-07-27 PROCEDURE — 77063 BREAST TOMOSYNTHESIS BI: CPT

## 2021-08-12 DIAGNOSIS — F33.42 RECURRENT MAJOR DEPRESSIVE DISORDER, IN FULL REMISSION (HCC): ICD-10-CM

## 2021-08-12 RX ORDER — BUPROPION HYDROCHLORIDE 200 MG/1
TABLET, EXTENDED RELEASE ORAL
Qty: 180 TABLET | Refills: 0 | Status: CANCELLED | OUTPATIENT
Start: 2021-08-12

## 2021-08-12 NOTE — TELEPHONE ENCOUNTER
MD Aquino Sample patient. Due appt and new provider. Thanks, Mariana Sexton    Last Visit: 8/28/20 MD Aquino Sample  Next Appointment: Not scheduled- Needs new provider and due appt  Previous Refill Encounter(s): 8/28/20 180 + 3    Requested Prescriptions     Pending Prescriptions Disp Refills    buPROPion SR (WELLBUTRIN, ZYBAN) 200 mg SR tablet 180 Tablet 0     Sig: TAKE 2 TABLETS DAILY FOR ANXIETY WITH DEPRESSION.  Office Visit due for further refills

## 2022-02-28 ENCOUNTER — OFFICE VISIT (OUTPATIENT)
Dept: FAMILY MEDICINE CLINIC | Age: 57
End: 2022-02-28
Payer: COMMERCIAL

## 2022-02-28 VITALS
BODY MASS INDEX: 20.95 KG/M2 | OXYGEN SATURATION: 99 % | HEART RATE: 69 BPM | WEIGHT: 138.2 LBS | HEIGHT: 68 IN | RESPIRATION RATE: 16 BRPM | TEMPERATURE: 98.7 F | DIASTOLIC BLOOD PRESSURE: 80 MMHG | SYSTOLIC BLOOD PRESSURE: 138 MMHG

## 2022-02-28 DIAGNOSIS — D64.9 ANEMIA, UNSPECIFIED TYPE: Primary | ICD-10-CM

## 2022-02-28 DIAGNOSIS — F41.9 ANXIETY: ICD-10-CM

## 2022-02-28 DIAGNOSIS — R53.83 OTHER FATIGUE: ICD-10-CM

## 2022-02-28 DIAGNOSIS — R03.0 ELEVATED BLOOD-PRESSURE READING, WITHOUT DIAGNOSIS OF HYPERTENSION: ICD-10-CM

## 2022-02-28 DIAGNOSIS — F33.0 DEPRESSION, MAJOR, RECURRENT, MILD (HCC): ICD-10-CM

## 2022-02-28 DIAGNOSIS — Z11.59 NEED FOR HEPATITIS C SCREENING TEST: ICD-10-CM

## 2022-02-28 PROCEDURE — 99214 OFFICE O/P EST MOD 30 MIN: CPT | Performed by: NURSE PRACTITIONER

## 2022-02-28 NOTE — PROGRESS NOTES
Chief Complaint   Patient presents with   [facility] Establish Care    Abnormal Lab Results     blood work         1. \"Have you been to the ER, urgent care clinic since your last visit? Hospitalized since your last visit? \" No    2. \"Have you seen or consulted any other health care providers outside of the [address] [place] [address] since your last visit? \" dr. Dale rizo    3. For patients over 45: Has the patient had a colonoscopy? Yes - no Care Gap present     If the patient is female:    4. For patients over 40: Has the patient had a mammogram? Yes - no Care Gap present    5. For patients over 21: Has the patient had a pap smear? Yes - no Care Gap present   Have you had the covid vaccine. .. when    3 most recent PHQ Screens 2/28/2022   Little interest or pleasure in doing things Not at all   Feeling down, depressed, irritable, or hopeless Not at all   Total Score PHQ 2 0   Trouble falling or staying asleep, or sleeping too much -   Feeling tired or having little energy -   Poor appetite, weight loss, or overeating -   Feeling bad about yourself - or that you are a failure or have let yourself or your family down -   Trouble concentrating on things such as school, work, reading, or watching TV -   Moving or speaking so slowly that other people could have noticed; or the opposite being so fidgety that others notice -   Thoughts of being better off dead, or hurting yourself in some way -   PHQ 9 Score -   How difficult have these problems made it for you to do your work, take care of your home and get along with others -       Health Maintenance Due   Topic Date Due    Hepatitis C Screening  Never done    Shingrix Vaccine Age 50> (2 of 2) 10/23/2020    Depression Monitoring  08/28/2021    Flu Vaccine (1) 09/01/2021

## 2022-02-28 NOTE — PATIENT INSTRUCTIONS

## 2022-02-28 NOTE — PROGRESS NOTES
[facility] Note     Edgardo Geller (: 1965) is a 55 y.o. female, established patient, here for evaluation of the following chief complaint(s):  Establish Care and Abnormal Lab Results (blood work)       ASSESSMENT/PLAN:  1. Anemia, unspecified type  -     CBC WITH AUTOMATED DIFF; Future  -     VITAMIN B12 & FOLATE; Future       -  PERIPHERAL SMEAR; Future  -     VITAMIN D, 25 HYDROXY; Future    2. Depression, major, recurrent, mild (HCC)  -Continue bupropion  mg daily  -We explored creating worklife balance and limiting work at home or the amount of time each evening  - REFERRAL TO [address] [place] [address]  - TSH 3RD GENERATION; Future  -     T4 (THYROXINE); Future  -     VITAMIN D, 25 HYDROXY; Future    3. Elevated blood-pressure reading, without diagnosis of hypertension  -     TSH 3RD GENERATION; Future  -     T4 (THYROXINE); Future  -     CBC WITH AUTOMATED DIFF; Future   -     Patient first CMP results reviewed with patient. Copies made for her records   - Diet and lifestyle modification encouraged for weight loss and chronic disease prevention/  management    4. Other fatigue  -Work-up in progress. I suspect her fatigue is multifactorial to include  undergoing treatment for CLL, work stress, sleep disturbance      - TSH 3RD GENERATION; Future  -     T4 (THYROXINE); Future  -     PERIPHERAL SMEAR; Future  -     CBC WITH AUTOMATED DIFF; Future  - Counseling on sleep hygiene provided:  Stick to a regular sleep schedule---even on weekends. Get regular exercise---avoid exercise in the late evening. Go to bed only when you are sleepy. Put your worries away when you go to bed. Do something relaxing and enjoyable before bedtime. Make your bedroom quiet and comfortable. Avoid large meals just before bedtime. Use your bedroom only for sleep and sexual activity. If you do not fall asleep within 15 to 20 minutes, get up and go to another room.  Return to bed only when you feel drowsy. Remove your clock from sight. Do not nap during the day. If you must nap, do so only for 30 minutes in the early afternoon. Avoid alcohol, nicotine, and caffeine. Avoid frequent use of sedatives. Spend time outdoors at the same time each day. Have your pharmacist check your medicines, in case any of them keep you from sleeping. Avoid bright lights from the TV, computers, video games, etc. before bed. 5. Need for hepatitis C screening test  -     HEPATITIS C AB; Future    6. Anxiety  -     REFERRAL TO BEHAVIORAL HEALTH    Sleep hygience    Return if symptoms worsen or fail to improve. SUBJECTIVE/OBJECTIVE:    Lázaro Lew is a 55 y.o. female seen today for fatigue. Ms. Penelope Ordaz is a 55 yr with a significant past medical history for atrial flutter reports increased fatigue over the last month. Today, she states \"I could have fallen asleep in the waiting room\". She has observed that this fatigue is different than when she experiences episodic atrial flutter. Ms. Penelope Ordaz is active and walks approximately 20 miles a week and has recently learned how to play pickle ball. She does share that her  was recently diagnosed with CLL but doing well with treatment. Ms. Penelope Ordaz is a  which work has also been a significant source of stress for her. Ms. Penelope Ordaz endorses difficulty sleeping; both trouble falling asleep and staying asleep. Jeyson Vance Her typical sleep pattern is to go to bed between at 930 and 10 PM.  Prior to going to bed she will watch TV or play a game or 2 on her phone. At night, she will typically wake up to use the bathroom at night when she is unable to fall back asleep. She has taken a Benadryl before which has been helpful. Ms. Penelope Ordaz has also brought in her blood work that was done while being evaluated at patient [facility] for an eye stye. She would like to discuss her lab results.             REVIEW OF SYSTEMS:    Review of Systems   Constitutional: Positive for fatigue. Negative for fever and unexpected weight change. HENT: Negative. Respiratory: Negative. Cardiovascular: Negative. Gastrointestinal: Negative. Genitourinary: Negative. Musculoskeletal: Negative. Skin: Negative. Psychiatric/Behavioral: Positive for sleep disturbance. VITAL SIGNS:    Wt Readings from Last 3 Encounters:   02/28/22 138 lb 3.2 oz (62.7 kg)   08/28/20 137 lb (62.1 kg)   08/12/19 139 lb 3.2 oz (63.1 kg)     Temp Readings from Last 3 Encounters:   02/28/22 98.7 °F (37.1 °C) (Temporal)   08/28/20 98.2 °F (36.8 °C) (Oral)   08/12/19 97.8 °F (36.6 °C) (Oral)     BP Readings from Last 3 Encounters:   02/28/22 138/80   08/28/20 109/80   08/12/19 124/86     Pulse Readings from Last 3 Encounters:   02/28/22 69   08/28/20 72   08/12/19 65           PHYSICAL EXAMINATION:       General: Alert, cooperative, no distress  Respiratory: Breathing comfortably, in no acute respiratory distress. Clear to auscultation bilaterally. Cardiovascular: Regular rate and rhythm, S1, S2 normal, no murmur, click, rub or gallop. Extremities: no edema. Abdomen: Soft, non-tender, not distended. Bowel sounds normal. No masses or organomegaly. MSK: Extremities normal appearing, atraumatic, no effusion. Gait steady and unassisted. Skin: Skin color, texture, turgor normal. No rashes or lesions on exposed skin. Neurologic: A/Ox3  Psychiatric: Normal affect. Mood euthymic. Thoughts logical. Speech volume and speed normal            Treatment risks/benefits/costs/interactions/alternatives discussed with patient. Advised patient to call back or return to office if symptoms worsen/change/persist. If patient cannot reach us or should anything more severe/urgent arise he/she should proceed directly to the nearest emergency department. Discussed expected course/resolution/complications of diagnosis in detail with patient. Patient expressed understanding with the diagnosis and plan. An electronic signature was used to authenticate this note.   -- Saulo Fuentes NP

## 2022-03-01 LAB
25(OH)D3+25(OH)D2 SERPL-MCNC: 37 NG/ML (ref 30–100)
BASOPHILS # BLD AUTO: 0 X10E3/UL (ref 0–0.2)
BASOPHILS NFR BLD AUTO: 1 %
CHOLEST SERPL-MCNC: 223 MG/DL (ref 100–199)
EOSINOPHIL # BLD AUTO: 0 X10E3/UL (ref 0–0.4)
EOSINOPHIL NFR BLD AUTO: 1 %
ERYTHROCYTE [DISTWIDTH] IN BLOOD BY AUTOMATED COUNT: 11.8 % (ref 11.7–15.4)
EST. AVERAGE GLUCOSE BLD GHB EST-MCNC: 97 MG/DL
FOLATE SERPL-MCNC: 14.9 NG/ML
HBA1C MFR BLD: 5 % (ref 4.8–5.6)
HCT VFR BLD AUTO: 41 % (ref 34–46.6)
HCV AB S/CO SERPL IA: <0.1 S/CO RATIO (ref 0–0.9)
HDLC SERPL-MCNC: 89 MG/DL
HGB BLD-MCNC: 13.8 G/DL (ref 11.1–15.9)
IMM GRANULOCYTES # BLD AUTO: 0 X10E3/UL (ref 0–0.1)
IMM GRANULOCYTES NFR BLD AUTO: 0 %
IMP & REVIEW OF LAB RESULTS: NORMAL
LDLC SERPL CALC-MCNC: 124 MG/DL (ref 0–99)
LYMPHOCYTES # BLD AUTO: 1.7 X10E3/UL (ref 0.7–3.1)
LYMPHOCYTES NFR BLD AUTO: 42 %
MCH RBC QN AUTO: 33.1 PG (ref 26.6–33)
MCHC RBC AUTO-ENTMCNC: 33.7 G/DL (ref 31.5–35.7)
MCV RBC AUTO: 98 FL (ref 79–97)
MONOCYTES # BLD AUTO: 0.3 X10E3/UL (ref 0.1–0.9)
MONOCYTES NFR BLD AUTO: 7 %
NEUTROPHILS # BLD AUTO: 2 X10E3/UL (ref 1.4–7)
NEUTROPHILS NFR BLD AUTO: 49 %
PATH INTERP BLD-IMP: ABNORMAL
PATH REV BLD -IMP: ABNORMAL
PATHOLOGIST NAME: ABNORMAL
PLATELET # BLD AUTO: 228 X10E3/UL (ref 150–450)
RBC # BLD AUTO: 4.17 X10E6/UL (ref 3.77–5.28)
T4 SERPL-MCNC: 7.6 UG/DL (ref 4.5–12)
TRIGL SERPL-MCNC: 58 MG/DL (ref 0–149)
TSH SERPL DL<=0.005 MIU/L-ACNC: 2.01 UIU/ML (ref 0.45–4.5)
VIT B12 SERPL-MCNC: 407 PG/ML (ref 232–1245)
VLDLC SERPL CALC-MCNC: 10 MG/DL (ref 5–40)
WBC # BLD AUTO: 4 X10E3/UL (ref 3.4–10.8)

## 2022-03-18 PROBLEM — G45.0 VERTEBROBASILAR ARTERY SYNDROME: Status: ACTIVE | Noted: 2017-11-17

## 2022-03-19 PROBLEM — M50.30 DDD (DEGENERATIVE DISC DISEASE), CERVICAL: Status: ACTIVE | Noted: 2018-06-27

## 2022-03-19 PROBLEM — J30.89 PERENNIAL ALLERGIC RHINITIS: Status: ACTIVE | Noted: 2018-06-27

## 2022-03-19 PROBLEM — F33.0 DEPRESSION, MAJOR, RECURRENT, MILD (HCC): Status: ACTIVE | Noted: 2019-06-24

## 2022-03-21 NOTE — Clinical Note
I scheduled her to see you 11/22 at 1:40pm-was in [facility] 11/17-11/18 for ? TIA. Vertebrobasilar artery syndrome. Feels great now. 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 Let me know if I need to change the appt. Thanks! Kevyn Ritter
Detail Level: Zone
Continue Regimen: Mometasone BID
Render In Strict Bullet Format?: No
Continue Regimen: Ketoconazole
Initiate Treatment: Mometasone BID, 2 weeks on. 1 week off. Repeat as needed

## 2022-03-23 ENCOUNTER — VIRTUAL VISIT (OUTPATIENT)
Dept: SOCIAL WORK | Age: 57
End: 2022-03-23
Payer: COMMERCIAL

## 2022-03-23 DIAGNOSIS — F33.0 DEPRESSION, MAJOR, RECURRENT, MILD (HCC): Primary | ICD-10-CM

## 2022-03-23 PROCEDURE — 90791 PSYCH DIAGNOSTIC EVALUATION: CPT | Performed by: SOCIAL WORKER

## 2022-03-23 NOTE — PROGRESS NOTES
Virtual Visit (At Home) -Initial Evaluation    Time Start:3:46 pm  Time End:4:58 pm    DX:     ICD-10-CM ICD-9-CM   1. Depression, major, recurrent, mild ([facility].)  F33.0 296.31            Met with Ms. Bc Castro 3/23/2022 for our first appointment. Ms. Bc Castro spoke to her NP, Meghan Cali, and requested to speak with a therapist.  She initially reported that she has been having trouble sleeping and that was the major impetus for our appt. However, as she began sharing the many areas in her life that she defines as stressful, there are other symptoms that need to be addressed. 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 She never feels like she can get all her work completed but has started to try and leave work when the kids go home and walk for 4-5 miles in the evening. She then has to complete school work later in the evening. She has started listening to books on tape as she walks and feels much more relaxed. Her  of 30 years is also a source of stress and worry. He apparently had a job as the  of a S[employer] for many years and some conflicts occurred and he left his job. This was the time when Covid was in full swing and he had a difficult time finding work. Ms. Bc Castro states that her  became irritable and frustrated while interviewing for an assortment of jobs. Meanwhile, the patient was the primary breadwinner. She admits to trying to be supportive yet angry at the situation. He has found a job as a private wealth manager, however, he is not particularly happy in this job. Ms. Bc Castro describes their relationship as tense with poor communication. They have two grown children: 1 son 32 and 1 daughter, 22. Ms. Bc Castro grew up in [place] with her biological parents and two sisters.   Her father passed away at age 71 from small cell lung cancer and her mother, 80 lives either on the [place] in the [facility] or in [place] during the winter. Ms. Tejal Lebron also states she worries about her mother as she is showing signs of memory loss. The patient does not describe any family history of mental illness, however, reports that her sister's daughter has attempted suicide X3. The patient does describe her own struggle with anxiety and depression. She is currently on medication and states she experienced post partum depression after her youngest child was born. Ms. Tejal Lebron describes her father as a functional alcoholic. There do not seem to be any issues with substances with other family members, including this patient. This patient would like some help in finding strategies to distract her at night so she can sleep better. Additionally, she would like some helpful tips on increasing positive communication with her . Mansoor Garnica is a 65 y.o. female who is alert and oriented X3.  she  does not have any suicidal or homicidal ideations. Patient is not psychotic or delusional.   she has not been psychiatrically admitted to a hospital. Ms. Tejal Lebron does have good eye contact during this interview. She is verbal and engaging. Patient does have good insight and judgement. she is a good candidate for short term therapy to address the above issues. We did set a follow-up appointment with this clinician. Follow-up appt date (if applicable) is:  April 13 @ 4  telemed. Brissa Salinas, who was evaluated through a synchronous (real-time) audio-video encounter, and/or her healthcare decision maker, is aware that it is a billable service, which includes applicable co-pays, with coverage as determined by her insurance carrier. She provided verbal consent to proceed and patient identification was verified.  This visit was conducted pursuant to the emergency declaration under the [facility], 1135 waiver authority and the Coronavirus Preparedness and Response Supplemental Appropriations Act. A caregiver was present when appropriate. Ability to conduct physical exam was limited. The patient was located at home in a state where the provider was licensed to provide care.      --Al Boyce LCSW on 3/23/2022 at 5:07 PM                             .

## 2022-09-20 DIAGNOSIS — F33.42 RECURRENT MAJOR DEPRESSIVE DISORDER, IN FULL REMISSION (HCC): ICD-10-CM

## 2022-09-20 RX ORDER — BUPROPION HYDROCHLORIDE 200 MG/1
TABLET, EXTENDED RELEASE ORAL
Qty: 180 TABLET | Refills: 3 | Status: SHIPPED | OUTPATIENT
Start: 2022-09-20

## 2022-09-20 NOTE — TELEPHONE ENCOUNTER
Patient call for refill bupropion 200mg to [facility] attached. Thanks, Jay Emerson    Last Visit: 2/28/22 NP Andres Collins  Next Appointment: None  Previous Refill Encounter(s): 8/28/20 180 + 3    Requested Prescriptions     Pending Prescriptions Disp Refills    buPROPion SR (WELLBUTRIN, ZYBAN) 200 mg SR tablet 180 Tablet 3     Sig: TAKE 2 TABLETS DAILY FOR ANXIETY WITH DEPRESSION. For [facility] in place:   Recommendation Provided To:    Intervention Detail: New Rx: 1, reason: Patient Preference  Gap Closed?:   Intervention Accepted By:   Time Spent (min): 5

## 2023-01-19 ENCOUNTER — HOSPITAL ENCOUNTER (OUTPATIENT)
Age: 58
Setting detail: OUTPATIENT SURGERY
Discharge: HOME | End: 2023-01-19
Attending: INTERNAL MEDICINE | Admitting: INTERNAL MEDICINE
Payer: COMMERCIAL

## 2023-01-19 ENCOUNTER — ANESTHESIA (OUTPATIENT)
Dept: ENDOSCOPY | Age: 58
End: 2023-01-19
Payer: COMMERCIAL

## 2023-01-19 ENCOUNTER — ANESTHESIA EVENT (OUTPATIENT)
Dept: ENDOSCOPY | Age: 58
End: 2023-01-19
Payer: COMMERCIAL

## 2023-01-19 VITALS
BODY MASS INDEX: 20.46 KG/M2 | SYSTOLIC BLOOD PRESSURE: 109 MMHG | RESPIRATION RATE: 13 BRPM | HEIGHT: 68 IN | TEMPERATURE: 97.3 F | HEART RATE: 60 BPM | OXYGEN SATURATION: 100 % | WEIGHT: 135 LBS | DIASTOLIC BLOOD PRESSURE: 67 MMHG

## 2023-01-19 PROCEDURE — 74011250636 HC RX REV CODE- 250/636: Performed by: NURSE ANESTHETIST, CERTIFIED REGISTERED

## 2023-01-19 PROCEDURE — 88305 TISSUE EXAM BY PATHOLOGIST: CPT

## 2023-01-19 PROCEDURE — 74011250637 HC RX REV CODE- 250/637: Performed by: INTERNAL MEDICINE

## 2023-01-19 PROCEDURE — 77030013992 HC SNR POLYP ENDOSC BSC -B: Performed by: INTERNAL MEDICINE

## 2023-01-19 PROCEDURE — 77030040684 HC NDL ENDOSC NEEDLEMASTR OCOA -B: Performed by: INTERNAL MEDICINE

## 2023-01-19 PROCEDURE — 77030021392 HC DEV RETRV POLYP BSC -B: Performed by: INTERNAL MEDICINE

## 2023-01-19 PROCEDURE — 77030010936 HC CLP LIG BSC -C: Performed by: INTERNAL MEDICINE

## 2023-01-19 PROCEDURE — 77030020268 HC MISC GENERAL SUPPLY: Performed by: INTERNAL MEDICINE

## 2023-01-19 PROCEDURE — 2709999900 HC NON-CHARGEABLE SUPPLY: Performed by: INTERNAL MEDICINE

## 2023-01-19 PROCEDURE — 76040000007: Performed by: INTERNAL MEDICINE

## 2023-01-19 PROCEDURE — 76060000032 HC ANESTHESIA 0.5 TO 1 HR: Performed by: INTERNAL MEDICINE

## 2023-01-19 PROCEDURE — 77030022875 HC PRB AR PLSM COAG ERBE -C: Performed by: INTERNAL MEDICINE

## 2023-01-19 DEVICE — CLIP
Type: IMPLANTABLE DEVICE | Site: ASCENDING COLON | Status: FUNCTIONAL
Brand: RESOLUTION 360™ ULTRA CLIP

## 2023-01-19 RX ORDER — PROPOFOL 10 MG/ML
INJECTION, EMULSION INTRAVENOUS AS NEEDED
Status: DISCONTINUED | OUTPATIENT
Start: 2023-01-19 | End: 2023-01-19 | Stop reason: HOSPADM

## 2023-01-19 RX ORDER — ATROPINE SULFATE 0.1 MG/ML
0.5 INJECTION INTRAVENOUS
Status: DISCONTINUED | OUTPATIENT
Start: 2023-01-19 | End: 2023-01-19 | Stop reason: HOSPADM

## 2023-01-19 RX ORDER — MIDAZOLAM HYDROCHLORIDE 1 MG/ML
.25-5 INJECTION, SOLUTION INTRAMUSCULAR; INTRAVENOUS
Status: DISCONTINUED | OUTPATIENT
Start: 2023-01-19 | End: 2023-01-19 | Stop reason: HOSPADM

## 2023-01-19 RX ORDER — DEXTROMETHORPHAN/PSEUDOEPHED 2.5-7.5/.8
1.2 DROPS ORAL
Status: DISCONTINUED | OUTPATIENT
Start: 2023-01-19 | End: 2023-01-19 | Stop reason: HOSPADM

## 2023-01-19 RX ORDER — NALOXONE HYDROCHLORIDE 0.4 MG/ML
0.4 INJECTION, SOLUTION INTRAMUSCULAR; INTRAVENOUS; SUBCUTANEOUS
Status: DISCONTINUED | OUTPATIENT
Start: 2023-01-19 | End: 2023-01-19 | Stop reason: HOSPADM

## 2023-01-19 RX ORDER — FENTANYL CITRATE 50 UG/ML
25-200 INJECTION, SOLUTION INTRAMUSCULAR; INTRAVENOUS
Status: DISCONTINUED | OUTPATIENT
Start: 2023-01-19 | End: 2023-01-19 | Stop reason: HOSPADM

## 2023-01-19 RX ORDER — FLUMAZENIL 0.1 MG/ML
0.2 INJECTION INTRAVENOUS
Status: DISCONTINUED | OUTPATIENT
Start: 2023-01-19 | End: 2023-01-19 | Stop reason: HOSPADM

## 2023-01-19 RX ORDER — EPINEPHRINE 0.1 MG/ML
1 INJECTION INTRACARDIAC; INTRAVENOUS
Status: DISCONTINUED | OUTPATIENT
Start: 2023-01-19 | End: 2023-01-19 | Stop reason: HOSPADM

## 2023-01-19 RX ORDER — SODIUM CHLORIDE 0.9 % (FLUSH) 0.9 %
5-40 SYRINGE (ML) INJECTION EVERY 8 HOURS
Status: DISCONTINUED | OUTPATIENT
Start: 2023-01-19 | End: 2023-01-19 | Stop reason: HOSPADM

## 2023-01-19 RX ORDER — SODIUM CHLORIDE 0.9 % (FLUSH) 0.9 %
5-40 SYRINGE (ML) INJECTION AS NEEDED
Status: DISCONTINUED | OUTPATIENT
Start: 2023-01-19 | End: 2023-01-19 | Stop reason: HOSPADM

## 2023-01-19 RX ORDER — SODIUM CHLORIDE 9 MG/ML
50 INJECTION, SOLUTION INTRAVENOUS CONTINUOUS
Status: DISCONTINUED | OUTPATIENT
Start: 2023-01-19 | End: 2023-01-19 | Stop reason: HOSPADM

## 2023-01-19 RX ORDER — SODIUM CHLORIDE 9 MG/ML
INJECTION, SOLUTION INTRAVENOUS
Status: DISCONTINUED | OUTPATIENT
Start: 2023-01-19 | End: 2023-01-19 | Stop reason: HOSPADM

## 2023-01-19 RX ADMIN — SODIUM CHLORIDE: 900 INJECTION, SOLUTION INTRAVENOUS at 11:45

## 2023-01-19 RX ADMIN — PROPOFOL 40 MG: 10 INJECTION, EMULSION INTRAVENOUS at 12:11

## 2023-01-19 RX ADMIN — PROPOFOL 40 MG: 10 INJECTION, EMULSION INTRAVENOUS at 11:55

## 2023-01-19 RX ADMIN — SODIUM CHLORIDE: 900 INJECTION, SOLUTION INTRAVENOUS at 12:02

## 2023-01-19 RX ADMIN — PROPOFOL 50 MG: 10 INJECTION, EMULSION INTRAVENOUS at 11:53

## 2023-01-19 RX ADMIN — PROPOFOL 100 MG: 10 INJECTION, EMULSION INTRAVENOUS at 11:44

## 2023-01-19 RX ADMIN — PROPOFOL 70 MG: 10 INJECTION, EMULSION INTRAVENOUS at 12:00

## 2023-01-19 RX ADMIN — PROPOFOL 20 MG: 10 INJECTION, EMULSION INTRAVENOUS at 11:49

## 2023-01-19 RX ADMIN — PROPOFOL 40 MG: 10 INJECTION, EMULSION INTRAVENOUS at 12:06

## 2023-01-19 RX ADMIN — PROPOFOL 50 MG: 10 INJECTION, EMULSION INTRAVENOUS at 11:45

## 2023-01-19 RX ADMIN — PROPOFOL 30 MG: 10 INJECTION, EMULSION INTRAVENOUS at 11:47

## 2023-01-19 NOTE — H&P
[address], [address] [place] [address]      History and Physical       NAME:  Maggie Blount   :   1965   MRN:   [id number]             History of Present Illness:  Patient is a 65 y.o. who is seen for ascending colon polyp. PMH:  Past Medical History:   Diagnosis Date    Depression 2008    Menopause 2006    Paroxysmal atrial flutter [facility])     resolved with ablation 2010 dr orellana    Stroke [facility])     TIA; no deficits       PSH:  Past Surgical History:   Procedure Laterality Date    HX BREAST BIOPSY Left     Surgical; benign    HX COLONOSCOPY      2005-normal.     HX TOTAL ABDOMINAL HYSTERECTOMY      AR ABLATION ARRHYTHMOGENIC FOCI/PATHWAY W/O BYPASS  2/15/2010       Allergies: Allergies   Allergen Reactions    Seasonale [Levonorgestrel-Ethinyl Estrad] Other (comments)       Home Medications:  Prior to Admission Medications   Prescriptions Last Dose Informant Patient Reported? Taking? Lactobacillus acidophilus 10 billion cell cap 2023  Yes Yes   Sig: Take  by mouth. buPROPion SR (WELLBUTRIN, ZYBAN) 200 mg SR tablet 2023  No Yes   Sig: TAKE 2 TABLETS DAILY FOR ANXIETY WITH DEPRESSION. famotidine (PEPCID) 20 mg tablet 2023  Yes Yes   Sig: Take 20 mg by mouth daily. fexofenadine (ALLEGRA) 180 mg tablet 2023  Yes Yes   Sig: Take 1 Tab by mouth daily.       Facility-Administered Medications: None       Hospital Medications:  Current Facility-Administered Medications   Medication Dose Route Frequency    0.9% sodium chloride infusion  50 mL/hr IntraVENous CONTINUOUS    sodium chloride (NS) flush 5-40 mL  5-40 mL IntraVENous Q8H    sodium chloride (NS) flush 5-40 mL  5-40 mL IntraVENous PRN    midazolam (VERSED) injection 0.25-5 mg  0.25-5 mg IntraVENous Multiple    fentaNYL citrate (PF) injection  mcg   mcg IntraVENous Multiple    naloxone (NARCAN) injection 0.4 mg  0.4 mg IntraVENous Multiple    flumazeniL (ROMAZICON) 0.1 mg/mL injection 0.2 mg  0.2 mg IntraVENous Multiple    simethicone (MYLICON) 31VO/2.5SB oral drops 80 mg  1.2 mL Oral Multiple    atropine injection 0.5 mg  0.5 mg IntraVENous ONCE PRN    EPINEPHrine (ADRENALIN) 0.1 mg/mL syringe 1 mg  1 mg Endoscopically ONCE PRN    methylene blue (PROVAYBLUE) 5 mg/mL injection 1 mL  1 mL Other ONCE       Social History:  Social History     Tobacco Use    Smoking status: Never    Smokeless tobacco: Never   Substance Use Topics    Alcohol use:  Yes     Alcohol/week: 2.5 standard drinks     Types: 3 Standard drinks or equivalent per week     Comment: maybe 2-3 glasses wine/week       Family History:  Family History   Problem Relation Age of Onset    Cancer Father         small cell lung    Heart Disease Father 71        in 45s, smoker    Heart Attack Father         quad bypass     Gout Father     Diabetes Father         ??/mild (later onset)     Alzheimer's Disease Maternal Grandmother     Cancer Paternal Grandfather         leukemia     Heart Disease Paternal [de-identified]         older age related chf     Migraines Sister              Review of Systems:      Constitutional: negative fever, negative chills, negative weight loss  Eyes:   negative visual changes  ENT:   negative sore throat, tongue or lip swelling  Respiratory:  negative cough, negative dyspnea  Cards:  negative for chest pain, palpitations, lower extremity edema  GI:   See HPI  :  negative for frequency, dysuria  Integument:  negative for rash and pruritus  Heme:  negative for easy bruising and gum/nose bleeding  Musculoskel: negative for myalgias,  back pain and muscle weakness  Neuro: negative for headaches, dizziness, vertigo  Psych:  negative for feelings of anxiety, depression       Objective:   Patient Vitals for the past 8 hrs:   BP Temp Pulse Resp SpO2 Height Weight   01/19/23 1113 133/76 98.2 °F (36.8 °C) (!) 56 18 98 % -- --   01/19/23 1105 -- -- -- -- -- 5' 8\" (1.727 m) 61.2 kg (135 lb)     No intake/output data recorded. No intake/output data recorded. EXAM:     NEURO-a&o   HEENT-wnl   LUNGS-clear    COR-regular rate and rhythym     ABD-soft , no tenderness, no rebound, good bs     EXT-no edema     Data Review     No results for input(s): WBC, HGB, HCT, PLT, HGBEXT, HCTEXT, PLTEXT in the last 72 hours. No results for input(s): NA, K, CL, CO2, BUN, CREA, GLU, PHOS, CA in the last 72 hours. No results for input(s): AP, TBIL, TP, ALB, GLOB, GGT, AML, LPSE in the last 72 hours. No lab exists for component: SGOT, GPT, AMYP, HLPSE  No results for input(s): INR, PTP, APTT, INREXT in the last 72 hours. Assessment:     Colon polyp     Patient Active Problem List   Diagnosis Code    Depression F32. A    Trigger point of neck M54.2    Cervicalgia M54.2    Tension headache G44.209    Vertebrobasilar artery syndrome G45.0    Perennial allergic rhinitis J30.89    DDD (degenerative disc disease), cervical M50.30    Depression, major, recurrent, mild (HCC) F33.0     Plan:   The patient was counseled at length about the risks of tana Covid-19 in the karen-operative and post-operative states including the recovery window of their procedure. The patient was made aware that tana Covid-19 after a surgical procedure may worsen their prognosis for recovering from the virus and lend to a higher morbidity and or mortality risk. The patient was given the options of postponing their procedure. All of the risks, benefits, and alternatives were discussed. The patient does wish to proceed with the procedure. Endoscopic procedure with MAC     Signed By: Lito Gilliland MD     1/19/2023  11:36 AM

## 2023-01-19 NOTE — PERIOP NOTES
Report from VALERIE Almeida Patient has been evaluated by anesthesia pre-procedure. Patient alert and oriented. Vital signs will not be charted by the Endoscopy nurse. All vitals, airway, and loc are monitored by anesthesia staff throughout procedure.       .Endoscope was pre-cleaned at bedside immediately following procedure by RICHARD     7ml endoclot used    Specimen verified

## 2023-01-19 NOTE — DISCHARGE INSTRUCTIONS
[facility]    COLON DISCHARGE INSTRUCTIONS    Rodo Offer  [id number]  1965    Discomfort:  Redness at IV site- apply warm compress to area; if redness or soreness persist- contact your physician  There may be a slight amount of blood passed from the rectum  Gaseous discomfort- walking, belching will help relieve any discomfort  You may not operate a vehicle for 12 hours  You may not engage in an occupation involving machinery or appliances for rest of today  You may not drink alcoholic beverages for at least 12 hours  Avoid making any critical decisions for at least 24 hour  DIET:  You may resume your regular diet - however -  remember your colon is empty and a heavy meal will produce gas. Avoid these foods:  vegetables, fried / greasy foods, carbonated drinks     ACTIVITY:  You may  resume your normal daily activities it is recommended that you spend the remainder of the day resting -  avoid any strenuous activity. CALL M.D. ANY SIGN OF:   Increasing pain, nausea, vomiting  Abdominal distension (swelling)  New increased bleeding (oral or rectal)  Fever (chills)  Pain in chest area  Bloody discharge from nose or mouth  Shortness of breath      Follow-up Instructions:   Call Dr. Kylie Norris for any questions or problems at [phone]          ENDOSCOPY FINDINGS:   The previously described polyp was removed with endoscopic mucosal resection. We will contact you about the pathology results. Please avoid NSAID's for at least two weeks. Telephone # [phone]      Signed By: Curlene Frame. Lucio Heimlich, MD     1/19/2023  12:24 PM       DISCHARGE SUMMARY from Nurse    The following personal items collected during your admission are returned to you:   Dental Appliance: Dental Appliances: None  Vision: Visual Aid: Glasses, At bedside  Hearing Aid:    Jewelry:    Clothing:    Other Valuables:    Valuables sent to safe:

## 2023-01-19 NOTE — ANESTHESIA POSTPROCEDURE EVALUATION
Procedure(s):  COLONOSCOPY WITH EMR  ENDOSCOPIC POLYPECTOMY  ENDOSCOPIC ARGON PLASMA COAGULATION  RESOLUTION CLIP  INJECTION. MAC    Anesthesia Post Evaluation        Patient location during evaluation: PACU  Patient participation: complete - patient participated  Level of consciousness: awake and alert  Pain management: adequate  Airway patency: patent  Anesthetic complications: no  Cardiovascular status: acceptable  Respiratory status: acceptable  Hydration status: acceptable  Comments: I have seen and evaluated the patient and is ready for discharge. Laurie Hernández MD    Post anesthesia nausea and vomiting:  none      INITIAL Post-op Vital signs:   Vitals Value Taken Time   BP 95/57 01/19/23 1230   Temp 36.3 °C (97.3 °F) 01/19/23 1221   Pulse 58 01/19/23 1234   Resp 13 01/19/23 1234   SpO2 100 % 01/19/23 1234   Vitals shown include unvalidated device data.

## 2023-01-19 NOTE — PROCEDURES
[address], [address] [place] [address]      Colonoscopy Operative Report    Jagruti Schreiber  [id number]  1965      Procedure Type:   Colonoscopy with endoscopic mucosal resection    Indications:  colon polyp    Pre-operative Diagnosis: see indication above    Post-operative Diagnosis:  See findings below    :  Patricia Renner. Norma Fisher MD    Staff: Endoscopy Technician-1: Genevieve Selby  Endoscopy RN-1: Patricia Renner RN     Referring Provider: Celeste Brody NP      Sedation:  MAC anesthesia Propofol      Procedure Details:  After informed consent was obtained with all risks and benefits of procedure explained and preoperative exam completed, the patient was taken to the endoscopy suite and placed in the left lateral decubitus position. Upon sequential sedation as per above, a digital rectal exam was performed demonstrating internal hemorrhoids. The Olympus pediatric videocolonoscope  was inserted in the rectum and carefully advanced to the cecum, which was identified by the ileocecal valve and appendiceal orifice. The cecum was identified by the ileocecal valve and appendiceal orifice. The quality of preparation was good. The colonoscope was slowly withdrawn with careful evaluation between folds. Retroflexion in the rectum was completed . Findings:   The previously described ascending colon polyp was seen. This was a 2.5 cm, linear and draped over a fold. The polyp was successfully lifted with Endoclot + methylene blue. The blue dye component clearly demarcated the polyp borders. Next, the polyp was removed in one piece using a 13 mm stiff hexagonal hot snare. The polypectomy margin was ablated with APC. The mucosal defect was closed with five Resolution 360 ULTRA clips. Specimen Removed:  1. Ascending colon polyp    Complications: None. EBL:  None.     Impression:      Ascending colon polyp - removed with endoscopic mucosal resection    Recommendations: Follow up surgical pathology  Timing of repeat colonoscopy to be determined  Avoid NSAID's for at least two weeks    Signed By: Negin Boyd.  Gabrielle Sampson MD     1/19/2023  12:29 PM

## 2023-01-19 NOTE — ANESTHESIA PREPROCEDURE EVALUATION
Relevant Problems   No relevant active problems       Anesthetic History   No history of anesthetic complications            Review of Systems / Medical History  Patient summary reviewed, nursing notes reviewed and pertinent labs reviewed    Pulmonary  Within defined limits                 Neuro/Psych   Within defined limits    CVA       Cardiovascular  Within defined limits          Dysrhythmias : atrial flutter           GI/Hepatic/Renal  Within defined limits              Endo/Other  Within defined limits           Other Findings              Physical Exam    Airway  Mallampati: II  TM Distance: > 6 cm  Neck ROM: normal range of motion   Mouth opening: Normal     Cardiovascular  Regular rate and rhythm,  S1 and S2 normal,  no murmur, click, rub, or gallop             Dental  No notable dental hx       Pulmonary  Breath sounds clear to auscultation               Abdominal  GI exam deferred       Other Findings            Anesthetic Plan    ASA: 2  Anesthesia type: MAC            Anesthetic plan and risks discussed with: Patient

## 2023-07-20 ENCOUNTER — TELEPHONE (OUTPATIENT)
Age: 58
End: 2023-07-20

## 2023-07-20 NOTE — TELEPHONE ENCOUNTER
Called patient. Two patient identifiers verified. Scheduled for CPE 7/26/23.     ----- Message from [facility] sent at 7/20/2023  8:43 AM EDT -----  Subject: Message to Provider    QUESTIONS  Information for Provider? Patient is calling in and she is wanting to have   her physical exam before school starts. She stated that they go back to   school the end of August. She is wanting to have something like the 9, 10,   11, 12th of August if possible, the next appointment was 08/25. She would   like to have anytime on those days. Please call her back to schedule. Thank you.   ---------------------------------------------------------------------------  --------------  Yamilet Marker INFO  [phone]; OK to leave message on voicemail  ---------------------------------------------------------------------------  --------------  SCRIPT ANSWERS  Relationship to Patient?  Self

## 2023-07-25 ENCOUNTER — TELEPHONE (OUTPATIENT)
Age: 58
End: 2023-07-25

## 2023-07-25 NOTE — TELEPHONE ENCOUNTER
Spoke to pt and inform her appt she has 7/26 with NP Dannie Eren has to be reschedule due to Skippy Eren will be out of office. Offer pt next available 8/28 pt declined. Pt is upset and said she needed this appt for this week due she is a  and supposedly [facility] nurse fit her in at last minute? Pt stated she need appt this Thursday 7/27 and anytime or Friday 7/28. Pt wants a call back from [address] [place] [address]. -TM 7/25/23

## 2023-10-11 SDOH — HEALTH STABILITY: PHYSICAL HEALTH: ON AVERAGE, HOW MANY DAYS PER WEEK DO YOU ENGAGE IN MODERATE TO STRENUOUS EXERCISE (LIKE A BRISK WALK)?: 6 DAYS

## 2023-10-11 SDOH — HEALTH STABILITY: PHYSICAL HEALTH: ON AVERAGE, HOW MANY MINUTES DO YOU ENGAGE IN EXERCISE AT THIS LEVEL?: 60 MIN

## 2023-10-11 ASSESSMENT — SOCIAL DETERMINANTS OF HEALTH (SDOH)
WITHIN THE LAST YEAR, HAVE TO BEEN RAPED OR FORCED TO HAVE ANY KIND OF SEXUAL ACTIVITY BY YOUR PARTNER OR EX-PARTNER?: NO
WITHIN THE LAST YEAR, HAVE YOU BEEN HUMILIATED OR EMOTIONALLY ABUSED IN OTHER WAYS BY YOUR PARTNER OR EX-PARTNER?: NO
WITHIN THE LAST YEAR, HAVE YOU BEEN KICKED, HIT, SLAPPED, OR OTHERWISE PHYSICALLY HURT BY YOUR PARTNER OR EX-PARTNER?: NO
WITHIN THE LAST YEAR, HAVE YOU BEEN AFRAID OF YOUR PARTNER OR EX-PARTNER?: NO

## 2023-10-17 SDOH — HEALTH STABILITY: PHYSICAL HEALTH: ON AVERAGE, HOW MANY MINUTES DO YOU ENGAGE IN EXERCISE AT THIS LEVEL?: 60 MIN

## 2023-10-17 SDOH — HEALTH STABILITY: PHYSICAL HEALTH: ON AVERAGE, HOW MANY DAYS PER WEEK DO YOU ENGAGE IN MODERATE TO STRENUOUS EXERCISE (LIKE A BRISK WALK)?: 6 DAYS

## 2023-10-18 ENCOUNTER — OFFICE VISIT (OUTPATIENT)
Dept: PRIMARY CARE CLINIC | Facility: CLINIC | Age: 58
End: 2023-10-18
Payer: COMMERCIAL

## 2023-10-18 VITALS
HEART RATE: 62 BPM | BODY MASS INDEX: 20.46 KG/M2 | WEIGHT: 135 LBS | SYSTOLIC BLOOD PRESSURE: 128 MMHG | RESPIRATION RATE: 16 BRPM | HEIGHT: 68 IN | OXYGEN SATURATION: 100 % | DIASTOLIC BLOOD PRESSURE: 81 MMHG | TEMPERATURE: 97.5 F

## 2023-10-18 DIAGNOSIS — R14.0 ABDOMINAL BLOATING: ICD-10-CM

## 2023-10-18 DIAGNOSIS — M79.641 BILATERAL HAND PAIN: ICD-10-CM

## 2023-10-18 DIAGNOSIS — Z76.89 ENCOUNTER TO ESTABLISH CARE: ICD-10-CM

## 2023-10-18 DIAGNOSIS — Z83.3 FAMILY HISTORY OF DIABETES MELLITUS: ICD-10-CM

## 2023-10-18 DIAGNOSIS — E55.9 VITAMIN D DEFICIENCY: ICD-10-CM

## 2023-10-18 DIAGNOSIS — M85.80 OSTEOPENIA, UNSPECIFIED LOCATION: ICD-10-CM

## 2023-10-18 DIAGNOSIS — Z23 NEED FOR SHINGLES VACCINE: ICD-10-CM

## 2023-10-18 DIAGNOSIS — Z01.84 IMMUNITY STATUS TESTING: ICD-10-CM

## 2023-10-18 DIAGNOSIS — R41.840 INATTENTION: ICD-10-CM

## 2023-10-18 DIAGNOSIS — Z77.22 EXPOSURE TO SECONDHAND SMOKE: ICD-10-CM

## 2023-10-18 DIAGNOSIS — Z00.00 WELL WOMAN EXAM WITHOUT GYNECOLOGICAL EXAM: Primary | ICD-10-CM

## 2023-10-18 DIAGNOSIS — E78.5 HYPERLIPIDEMIA, UNSPECIFIED HYPERLIPIDEMIA TYPE: ICD-10-CM

## 2023-10-18 DIAGNOSIS — M79.642 BILATERAL HAND PAIN: ICD-10-CM

## 2023-10-18 DIAGNOSIS — Z23 ENCOUNTER FOR IMMUNIZATION: ICD-10-CM

## 2023-10-18 PROCEDURE — 99204 OFFICE O/P NEW MOD 45 MIN: CPT

## 2023-10-18 PROCEDURE — 90674 CCIIV4 VAC NO PRSV 0.5 ML IM: CPT

## 2023-10-18 PROCEDURE — 90471 IMMUNIZATION ADMIN: CPT

## 2023-10-18 RX ORDER — PREDNISONE 20 MG/1
TABLET ORAL
Qty: 10 TABLET | Refills: 0 | Status: SHIPPED | OUTPATIENT
Start: 2023-10-18

## 2023-10-18 RX ORDER — ALPRAZOLAM 0.25 MG/1
TABLET ORAL
COMMUNITY
Start: 2017-11-22

## 2023-10-18 RX ORDER — ZOSTER VACCINE RECOMBINANT, ADJUVANTED 50 MCG/0.5
0.5 KIT INTRAMUSCULAR SEE ADMIN INSTRUCTIONS
Qty: 0.5 ML | Refills: 0 | Status: SHIPPED | OUTPATIENT
Start: 2023-10-18 | End: 2023-10-19

## 2023-10-18 SDOH — ECONOMIC STABILITY: FOOD INSECURITY: WITHIN THE PAST 12 MONTHS, YOU WORRIED THAT YOUR FOOD WOULD RUN OUT BEFORE YOU GOT MONEY TO BUY MORE.: NEVER TRUE

## 2023-10-18 SDOH — ECONOMIC STABILITY: FOOD INSECURITY: WITHIN THE PAST 12 MONTHS, THE FOOD YOU BOUGHT JUST DIDN'T LAST AND YOU DIDN'T HAVE MONEY TO GET MORE.: NEVER TRUE

## 2023-10-18 SDOH — ECONOMIC STABILITY: HOUSING INSECURITY
IN THE LAST 12 MONTHS, WAS THERE A TIME WHEN YOU DID NOT HAVE A STEADY PLACE TO SLEEP OR SLEPT IN A SHELTER (INCLUDING NOW)?: NO

## 2023-10-18 SDOH — ECONOMIC STABILITY: INCOME INSECURITY: HOW HARD IS IT FOR YOU TO PAY FOR THE VERY BASICS LIKE FOOD, HOUSING, MEDICAL CARE, AND HEATING?: NOT HARD AT ALL

## 2023-10-18 ASSESSMENT — ENCOUNTER SYMPTOMS
NAUSEA: 0
SHORTNESS OF BREATH: 0
SINUS PRESSURE: 0
TROUBLE SWALLOWING: 0
VOICE CHANGE: 0
VOMITING: 0
ABDOMINAL PAIN: 0
CHEST TIGHTNESS: 0
WHEEZING: 0
COUGH: 0
CONSTIPATION: 0
BACK PAIN: 0
DIARRHEA: 0

## 2023-10-18 ASSESSMENT — PATIENT HEALTH QUESTIONNAIRE - PHQ9
1. LITTLE INTEREST OR PLEASURE IN DOING THINGS: 0
SUM OF ALL RESPONSES TO PHQ QUESTIONS 1-9: 0
2. FEELING DOWN, DEPRESSED OR HOPELESS: 0
SUM OF ALL RESPONSES TO PHQ QUESTIONS 1-9: 0
SUM OF ALL RESPONSES TO PHQ9 QUESTIONS 1 & 2: 0

## 2023-10-18 NOTE — PROGRESS NOTES
[facility]   [address] [place] [address] Basia Saleh [place] Linus Escamilla  P: [phone]  F: [phone]    SUBJECTIVE     HPI:     Rafael Jasmine is a 63 y.o. female who is seen in the clinic for   Chief Complaint   Patient presents with    New Patient        New patient who presents to establish care. She has a PMhx of depression, paroxysmal atrial flutter, hyperlipidemia, depression. She c/o bilateral thumb pain and limited ROM, ongoing for 2 weeks. Has taken OTC medication for the pain without relief. Wondering if this is arthritis. Has noticed bilateral  strength is weaker lately due to pain, swelling. No injury, trauma. She would like to see a Pulmonologist due to exposure to second hand smoke as a child. Father smoked \"constantly\" indoors and in the car. \"I can run a mile without trouble but feel like I can't get past that\", feels like she has a difficult time breathing, slows down/walks and feels better. Mother sees Pulmonology for annual screenings and wonders if she should be doing the same. Never smoker. No dyspnea, chest tightness reported. Paroxysmal atrial flutter: S/p ablation \"more than 10 years ago\". She is followed by Dr Paula Garibay, Cardiologist. She was told she did not need a daily aspirin. HLD: Total , TG 58, HDL 89,  on most recent lipid panel 2/2022. Managed with lifestyle. Depression: Taking Wellbutrin  mg. States this is working well for her. In 11/2017 she pesented to ED w/ c/o headahce, balance problem, visual disturbance. CT negative, CTA head/neck \without stenosis or aneurysm, incidentally noted aberrant right subclavian artery with retroesophageal course, MRI brain negative for acute abnormality. Neurology consulted recommedning daily ASA, low dose statin. She had a TTE which showed normal LV function, size. EF 60-65%, mild miltral regurgitation. Diagnosed with TIA. No recurrence of symptoms since.      \"I am constantly bloated, I belch

## 2023-10-26 DIAGNOSIS — R06.09 DYSPNEA ON EXERTION: Primary | ICD-10-CM

## 2023-10-27 ENCOUNTER — TELEPHONE (OUTPATIENT)
Dept: PRIMARY CARE CLINIC | Facility: CLINIC | Age: 58
End: 2023-10-27

## 2023-10-27 NOTE — TELEPHONE ENCOUNTER
Called Patient and informed her that Estefani put in new referral and it has been faxed to [facility]

## 2023-10-30 ENCOUNTER — TELEPHONE (OUTPATIENT)
Dept: PRIMARY CARE CLINIC | Facility: CLINIC | Age: 58
End: 2023-10-30

## 2023-10-30 NOTE — TELEPHONE ENCOUNTER
Patient calling because she needs Pulmonary referral to say Pulmonary Function test or PST for her to have the test done and also needs provider signature. Please fax to pulmonary office when completed.

## 2023-10-31 DIAGNOSIS — Z77.22 EXPOSURE TO SECONDHAND SMOKE: Primary | ICD-10-CM

## 2023-11-06 ENCOUNTER — TELEPHONE (OUTPATIENT)
Age: 58
End: 2023-11-06

## 2023-11-06 ENCOUNTER — TELEPHONE (OUTPATIENT)
Dept: PRIMARY CARE CLINIC | Facility: CLINIC | Age: 58
End: 2023-11-06

## 2023-11-06 NOTE — TELEPHONE ENCOUNTER
Patient is calling because the Pulmonology clinic keeps calling her and telling her they can't schedule her until the Referral from our clinic says PULMONARY FUNCTION TEST  It has to say PFT. Please call patient once referral is placed and faxed.

## 2023-11-06 NOTE — TELEPHONE ENCOUNTER
Called [facility] at [facility] and left a message stating we need to get more clarification for the referral to make sure it will be sent correctly this time

## 2023-11-06 NOTE — TELEPHONE ENCOUNTER
Patient is calling to [facility] up on her previous message from 10/20 regding a referral from Dr.Emmaline Gonzalez to schedule an apptmt with Dr. Tee. Please call [phone]

## 2023-11-07 ENCOUNTER — TELEPHONE (OUTPATIENT)
Dept: PRIMARY CARE CLINIC | Facility: CLINIC | Age: 58
End: 2023-11-07

## 2023-11-07 LAB
25(OH)D3+25(OH)D2 SERPL-MCNC: 36.9 NG/ML (ref 30–100)
ALBUMIN SERPL-MCNC: 4.1 G/DL (ref 3.8–4.9)
ALBUMIN/GLOB SERPL: 1.8 {RATIO} (ref 1.2–2.2)
ALP SERPL-CCNC: 73 IU/L (ref 44–121)
ALT SERPL-CCNC: 19 IU/L (ref 0–32)
AST SERPL-CCNC: 18 IU/L (ref 0–40)
BASOPHILS # BLD AUTO: 0 X10E3/UL (ref 0–0.2)
BASOPHILS NFR BLD AUTO: 1 %
BILIRUB SERPL-MCNC: 0.3 MG/DL (ref 0–1.2)
BUN SERPL-MCNC: 13 MG/DL (ref 6–24)
BUN/CREAT SERPL: 16 (ref 9–23)
CALCIUM SERPL-MCNC: 9 MG/DL (ref 8.7–10.2)
CHLORIDE SERPL-SCNC: 106 MMOL/L (ref 96–106)
CHOLEST SERPL-MCNC: 193 MG/DL (ref 100–199)
CO2 SERPL-SCNC: 26 MMOL/L (ref 20–29)
CREAT SERPL-MCNC: 0.82 MG/DL (ref 0.57–1)
EGFRCR SERPLBLD CKD-EPI 2021: 83 ML/MIN/1.73
EOSINOPHIL # BLD AUTO: 0.1 X10E3/UL (ref 0–0.4)
EOSINOPHIL NFR BLD AUTO: 2 %
ERYTHROCYTE [DISTWIDTH] IN BLOOD BY AUTOMATED COUNT: 11.3 % (ref 11.7–15.4)
GLOBULIN SER CALC-MCNC: 2.3 G/DL (ref 1.5–4.5)
GLUCOSE SERPL-MCNC: 91 MG/DL (ref 70–99)
HBA1C MFR BLD: 5.3 % (ref 4.8–5.6)
HBV SURFACE AB SER QL: NON REACTIVE
HCT VFR BLD AUTO: 38.7 % (ref 34–46.6)
HDLC SERPL-MCNC: 83 MG/DL
HGB BLD-MCNC: 13 G/DL (ref 11.1–15.9)
IMM GRANULOCYTES # BLD AUTO: 0 X10E3/UL (ref 0–0.1)
IMM GRANULOCYTES NFR BLD AUTO: 0 %
LDLC SERPL CALC-MCNC: 99 MG/DL (ref 0–99)
LYMPHOCYTES # BLD AUTO: 1.5 X10E3/UL (ref 0.7–3.1)
LYMPHOCYTES NFR BLD AUTO: 41 %
MCH RBC QN AUTO: 33.1 PG (ref 26.6–33)
MCHC RBC AUTO-ENTMCNC: 33.6 G/DL (ref 31.5–35.7)
MCV RBC AUTO: 99 FL (ref 79–97)
MONOCYTES # BLD AUTO: 0.3 X10E3/UL (ref 0.1–0.9)
MONOCYTES NFR BLD AUTO: 8 %
NEUTROPHILS # BLD AUTO: 1.8 X10E3/UL (ref 1.4–7)
NEUTROPHILS NFR BLD AUTO: 48 %
PLATELET # BLD AUTO: 200 X10E3/UL (ref 150–450)
POTASSIUM SERPL-SCNC: 4.4 MMOL/L (ref 3.5–5.2)
PROT SERPL-MCNC: 6.4 G/DL (ref 6–8.5)
RBC # BLD AUTO: 3.93 X10E6/UL (ref 3.77–5.28)
SODIUM SERPL-SCNC: 143 MMOL/L (ref 134–144)
TRIGL SERPL-MCNC: 60 MG/DL (ref 0–149)
TSH SERPL DL<=0.005 MIU/L-ACNC: 2.24 UIU/ML (ref 0.45–4.5)
VLDLC SERPL CALC-MCNC: 11 MG/DL (ref 5–40)
WBC # BLD AUTO: 3.7 X10E3/UL (ref 3.4–10.8)

## 2023-11-07 NOTE — TELEPHONE ENCOUNTER
Mita from [facility] returned my phone call stating the patient thought she was getting the Pulmonary Function Test done and Mita wanted to know if it was needed and if so they will need a signed order to do one

## 2023-11-08 NOTE — TELEPHONE ENCOUNTER
Called Mita and left message stating patient does not need PFT test but if she has any other questions I could speak with her

## 2023-11-18 NOTE — PROGRESS NOTES
[facility]  [facility] [address], [facility], [address]  Office:  [phone]  Fax: [phone]                  Initial Office Exam  Patient Name: Margarita Wheeler  Age: 63 y.o. Gender: female   Handedness: right handed   Presenting Concern: attention and concentration deficits; anxiety  Primary Care Physician: LINDSAY Rowley NP  Referring Provider: TAMRA Rowley      REASON FOR REFERRAL:  This comprehensive and medically necessary neuropsychological assessment was requested to assist a differential diagnosis of cognitive and psychological concerns. The use and purpose of this examination, as well as the extent and limitations of confidentiality, were explained prior to obtaining permission to participate. Instructions were provided regarding the necessity to put forth optimal effort and answer questions truthfully in order to obtain reliable and accurate test results. REVIEW OF RECORDS:  Ms. Jackie Okeefe was referred by her PCP for a work-up of attention and concentration deficits. A history of TIA is noted. A history of depression and anxiety is noted; Wellbutrin and Xanax are prescribed. Socially, Ms. Jackie Okeefe is prescribed as a . An MRI of the brain on 11/17/17, ordered for TIA and headache, showed no acute intracranial abnormality. A CT of the head on 11/17/17, ordered for weakness, showed no acute intracranial abnormality. CTA studies of the neck and head, on 11/17/17, showed the following:  No evidence for intracranial aneurysm or significant stenosis.   Incidentally noted aberrant right subclavian artery with retroesophageal course         Current Outpatient Medications   Medication Sig Dispense Refill    ALPRAZolam (XANAX) 0.25 MG tablet TAKE 1/2 TO 1 TABLET BY MOUTH TWICE A DAY AS NEEDED FOR ANXIETY      predniSONE (DELTASONE) 20 MG tablet I want the patient to take the medication po as follows: 3 pills x 1 day, 2 pills x 2 days, 1

## 2023-11-20 ENCOUNTER — TELEMEDICINE (OUTPATIENT)
Age: 58
End: 2023-11-20
Payer: COMMERCIAL

## 2023-11-20 DIAGNOSIS — R41.3 MEMORY LOSS: ICD-10-CM

## 2023-11-20 DIAGNOSIS — R41.840 ATTENTION AND CONCENTRATION DEFICIT: Primary | ICD-10-CM

## 2023-11-20 DIAGNOSIS — F41.9 ANXIETY: ICD-10-CM

## 2023-11-20 DIAGNOSIS — R41.844 EXECUTIVE FUNCTION DEFICIT: ICD-10-CM

## 2023-11-20 DIAGNOSIS — R47.89 WORD FINDING DIFFICULTY: ICD-10-CM

## 2023-11-20 PROCEDURE — 90791 PSYCH DIAGNOSTIC EVALUATION: CPT | Performed by: PSYCHOLOGIST

## 2023-11-24 ENCOUNTER — TELEPHONE (OUTPATIENT)
Age: 58
End: 2023-11-24

## 2023-11-24 NOTE — TELEPHONE ENCOUNTER
Patient called stating she was doing the PTSD questionnaire and she answered \"No\" to a question and it told her to call us. Please advise.  [phone]

## 2023-11-28 NOTE — TELEPHONE ENCOUNTER
Returned call to patient regarding the message received after answering \"No\" to the first question on DAPS - issue resolved

## 2023-11-30 ENCOUNTER — TELEPHONE (OUTPATIENT)
Age: 58
End: 2023-11-30

## 2023-11-30 NOTE — TELEPHONE ENCOUNTER
Contacted [employer] no PA is needed for [address], [id number], [id number], [id number], [id number], [id number].   Tracking ID [id number]

## 2023-12-01 ENCOUNTER — TELEPHONE (OUTPATIENT)
Age: 58
End: 2023-12-01

## 2023-12-01 NOTE — TELEPHONE ENCOUNTER
Patient would like to reschedule her apptmt with Dr. Tee on 12/13. She is a  and would like a call back between 11:40am and 12 pm. Please call [phone]

## 2023-12-07 ENCOUNTER — TELEPHONE (OUTPATIENT)
Dept: PRIMARY CARE CLINIC | Facility: CLINIC | Age: 58
End: 2023-12-07

## 2023-12-07 NOTE — TELEPHONE ENCOUNTER
----- Message from Jhony Matos sent at 12/6/2023 12:23 PM EST -----  Subject: Appointment Request    Reason for Call: Established Patient Appointment needed: Routine Existing   Condition Follow Up    QUESTIONS    Reason for appointment request? Available appointments did not meet   patient need     Additional Information for Provider? pt was told to come in Dec for follow   up from labs and nothing is available until Jan. pt needs an appt within   the next two weeks after 3pm- please call and schedule- if nothing is   available she will take whats in Jan .  ---------------------------------------------------------------------------  --------------  CALL BACK INFO  [phone]; OK to leave message on voicemail  ---------------------------------------------------------------------------  --------------  SCRIPT ANSWERS

## 2023-12-22 ENCOUNTER — TELEPHONE (OUTPATIENT)
Age: 58
End: 2023-12-22

## 2023-12-22 NOTE — TELEPHONE ENCOUNTER
Called patient to reschedule the feedback appt due to Dr Tee being ooo.  Left message cancelling the appt and for the patient to call back to r/s the appt with Dr JACOB

## 2024-01-05 ENCOUNTER — TELEMEDICINE (OUTPATIENT)
Age: 59
End: 2024-01-05

## 2024-01-05 DIAGNOSIS — R41.3 MEMORY LOSS: ICD-10-CM

## 2024-01-05 DIAGNOSIS — R41.840 ATTENTION AND CONCENTRATION DEFICIT: ICD-10-CM

## 2024-01-05 DIAGNOSIS — F41.9 ANXIETY AND DEPRESSION: ICD-10-CM

## 2024-01-05 DIAGNOSIS — G31.84 MILD COGNITIVE IMPAIRMENT: Primary | ICD-10-CM

## 2024-01-05 DIAGNOSIS — F32.A ANXIETY AND DEPRESSION: ICD-10-CM

## 2024-01-05 NOTE — PROGRESS NOTES
Apple Moseley, was evaluated through a synchronous (real-time) audio-video encounter. The patient (or guardian if applicable) is aware that this is a billable service, which includes applicable co-pays. This Virtual Visit was conducted with patient's (and/or legal guardian's) consent. Patient identification was verified, and a caregiver was present when appropriate.   The patient was located at Home: [address] [facility] [address]  Provider was located at Facility (Appt Dept): [address] [place] [address] [address]  [place] [address]      Apple Moseley (:  1965) is a Established patient, presenting virtually for evaluation of the following:      A neuropsychological evaluation was completed with the patient on 2023     Prior to seeing the patient for today's visit, I reviewed pertinent records, including the previously completed report, the records in Epic, and any updated visits from other providers since the patient's last visit.     During today's appointment I administered the following measures: Fluency, NMSE, All normal.          I provided feedback services related to the previously completed report. Attendees included: Patient.   Education was provided regarding my diagnostic impressions, and we discussed treatment plan/options. Attendees were provided with the opportunity to ask questions, which were answered to the best of my ability.     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  It is a moderate problem masked to some degree by intellectual capacity which included superior range domain scores.  General learning, memory, and performances across all other neurocognitive domains assessed, including executive functioning abilities are fully normal.  From an emotional standpoint, there is a history of anxiety and depression which appear currently fairly well-managed on her

## 2024-01-10 DIAGNOSIS — F98.8 ATTENTION DEFICIT DISORDER, UNSPECIFIED HYPERACTIVITY PRESENCE: Primary | ICD-10-CM

## 2024-02-16 ENCOUNTER — OFFICE VISIT (OUTPATIENT)
Dept: PRIMARY CARE CLINIC | Facility: CLINIC | Age: 59
End: 2024-02-16
Payer: COMMERCIAL

## 2024-02-16 VITALS
BODY MASS INDEX: 20 KG/M2 | RESPIRATION RATE: 16 BRPM | HEART RATE: 69 BPM | OXYGEN SATURATION: 99 % | TEMPERATURE: 97.5 F | DIASTOLIC BLOOD PRESSURE: 82 MMHG | HEIGHT: 68 IN | WEIGHT: 132 LBS | SYSTOLIC BLOOD PRESSURE: 129 MMHG

## 2024-02-16 DIAGNOSIS — R14.0 ABDOMINAL BLOATING: ICD-10-CM

## 2024-02-16 DIAGNOSIS — Z23 NEED FOR HEPATITIS B BOOSTER VACCINATION: ICD-10-CM

## 2024-02-16 DIAGNOSIS — R71.8 ELEVATED MCV: Primary | ICD-10-CM

## 2024-02-16 DIAGNOSIS — I48.0 PAROXYSMAL A-FIB (HCC): ICD-10-CM

## 2024-02-16 DIAGNOSIS — E78.5 HYPERLIPIDEMIA, UNSPECIFIED HYPERLIPIDEMIA TYPE: ICD-10-CM

## 2024-02-16 DIAGNOSIS — Z86.73 HISTORY OF TIA (TRANSIENT ISCHEMIC ATTACK): ICD-10-CM

## 2024-02-16 DIAGNOSIS — F90.2 ATTENTION DEFICIT HYPERACTIVITY DISORDER (ADHD), COMBINED TYPE: ICD-10-CM

## 2024-02-16 PROCEDURE — 99214 OFFICE O/P EST MOD 30 MIN: CPT

## 2024-02-16 RX ORDER — LISDEXAMFETAMINE DIMESYLATE 30 MG/1
30 CAPSULE ORAL EVERY MORNING
COMMUNITY
Start: 2024-02-10

## 2024-02-16 NOTE — PROGRESS NOTES
[facility]   [address], [address]  [place] [address]  P: [phone]  F: [phone]    SUBJECTIVE     HPI:     Apple Moseley is a 58 y.o. female who is seen in the clinic for   Chief Complaint   Patient presents with    Follow-up      Established patient here for a follow up. She has a PMhx of depression, paroxysmal atrial flutter, hyperlipidemia. depression and anxiety, ADHD.     Labs were drawn prior to her appointment which show nonreactive hepatitis B antibody. Received one dose of  Hepaittis B booster at her local pharmacy. Labs show marginally elevated MCV, MCH, which was seen previously. She is concerned about this. Feels well today.     Paroxysmal atrial flutter: S/p ablation \"more than 10 years ago\". She is followed by Dr Salguero, Cardiologist.      Hx of TIA in 2017. No recurrence of symptoms. Please see previous note. She is taking a daily aspirin.    HLD: Total , TG 60, HDL 83, LDL 99 on most recent lipid panel 11/6/23, Total CHL, LDL improved from 2/22.  ASCVD risk is low.     Depression:Taking Wellbutrin  mg which works well for her. She denies SI, HI    Hx of anxiety, claustrophobia. Taking Xanax 0.25mg BID PRN. Prescribed by her Ob-Gyn currently.     Diagnosed with ADHD by Dr Mccarty, Neuropsychologist. Taking Vyvanse prescribed by Psychiatry. Has been helpful at work and at home. Has not noticed palpitations, dry mouth, sleep problems while taking.     Following up with GI in next few months to have endoscopy for c/o abdominal bloating, belching. She takes an OTC probiotic.     Health screenings:  Colon cancer screening: Colonoscopy UTD, 1/2023, with Dr Boudreaux.    Breast cancer screening: Mammogram UTD, done with Ob-Gyn  Cervical cancer screening:  Her Pap smear is UTD, followed by Dr Murillo, Ob-Gyn, [facility]     Patient Active Problem List   Diagnosis    Tension headache    Vertebrobasilar artery syndrome    Perennial allergic rhinitis    Cervicalgia    Depression

## 2024-02-27 ENCOUNTER — TELEPHONE (OUTPATIENT)
Age: 59
End: 2024-02-27

## 2024-02-27 NOTE — TELEPHONE ENCOUNTER
Left second vm for patient to return call or respond to My Chart sent regarding referral to [facility].

## 2024-03-07 ENCOUNTER — TELEPHONE (OUTPATIENT)
Age: 59
End: 2024-03-07

## 2024-03-07 NOTE — TELEPHONE ENCOUNTER
Three phone calls (2 voice mails) and one My Chart message sent to patient in regarding to completing intake screening. Patient has not returned call. Will close referral at this time.

## 2024-04-12 ENCOUNTER — TELEPHONE (OUTPATIENT)
Dept: PRIMARY CARE CLINIC | Facility: CLINIC | Age: 59
End: 2024-04-12

## 2024-04-12 NOTE — TELEPHONE ENCOUNTER
Momo will be out of the office 6/3-6/7 and the patient's appt needs to be rescheduled. Called to discuss, no answer, left voice message.

## 2024-06-18 ENCOUNTER — TELEPHONE (OUTPATIENT)
Dept: PRIMARY CARE CLINIC | Facility: CLINIC | Age: 59
End: 2024-06-18

## 2024-06-18 NOTE — TELEPHONE ENCOUNTER
Spoke to patient scheduled a new patient appointment for 08/24. Advised patient she can call any day to see if we have a cancellations.

## 2024-06-18 NOTE — TELEPHONE ENCOUNTER
----- Message from Sharad Rio Sanchez sent at 6/18/2024  9:43 AM EDT -----  Regarding: [facility] Appointment Request  [facility] Appointment Request    Patient needs appointment for E[facility] Appointment Type: New to Provider.    Reason for Appointment Request: Requested Provider unavailable  Patient would like to request an appointment with Geri Barr , appointment reason old pcp is leaving the practice and patient would like Geri Barr to be her new PCP, appointment date requested June 24  --------------------------------------------------------------------------------------------------------------------------    Relationship to Patient: Self     Call Back Information: [facility]  Preferred Call Back Number: Phone [phone]

## 2024-08-29 ENCOUNTER — OFFICE VISIT (OUTPATIENT)
Dept: PRIMARY CARE CLINIC | Facility: CLINIC | Age: 59
End: 2024-08-29
Payer: COMMERCIAL

## 2024-08-29 VITALS
BODY MASS INDEX: 20.13 KG/M2 | TEMPERATURE: 97.5 F | WEIGHT: 132.8 LBS | DIASTOLIC BLOOD PRESSURE: 80 MMHG | RESPIRATION RATE: 12 BRPM | OXYGEN SATURATION: 99 % | HEIGHT: 68 IN | SYSTOLIC BLOOD PRESSURE: 116 MMHG | HEART RATE: 75 BPM

## 2024-08-29 DIAGNOSIS — D75.89 MACROCYTOSIS: ICD-10-CM

## 2024-08-29 DIAGNOSIS — E55.9 VITAMIN D DEFICIENCY: ICD-10-CM

## 2024-08-29 DIAGNOSIS — E78.5 HYPERLIPIDEMIA, UNSPECIFIED HYPERLIPIDEMIA TYPE: ICD-10-CM

## 2024-08-29 DIAGNOSIS — Z86.73 HISTORY OF TIA (TRANSIENT ISCHEMIC ATTACK): ICD-10-CM

## 2024-08-29 DIAGNOSIS — D75.89 MACROCYTOSIS: Primary | ICD-10-CM

## 2024-08-29 PROBLEM — F33.0 DEPRESSION, MAJOR, RECURRENT, MILD (HCC): Status: RESOLVED | Noted: 2019-06-24 | Resolved: 2024-08-29

## 2024-08-29 PROCEDURE — 99214 OFFICE O/P EST MOD 30 MIN: CPT | Performed by: FAMILY MEDICINE

## 2024-08-29 RX ORDER — SIMETHICONE 80 MG
80 TABLET,CHEWABLE ORAL EVERY 6 HOURS PRN
COMMUNITY

## 2024-08-29 RX ORDER — IBUPROFEN 200 MG
200 TABLET ORAL EVERY 6 HOURS PRN
COMMUNITY

## 2024-08-29 SDOH — ECONOMIC STABILITY: FOOD INSECURITY: WITHIN THE PAST 12 MONTHS, THE FOOD YOU BOUGHT JUST DIDN'T LAST AND YOU DIDN'T HAVE MONEY TO GET MORE.: NEVER TRUE

## 2024-08-29 SDOH — ECONOMIC STABILITY: FOOD INSECURITY: WITHIN THE PAST 12 MONTHS, YOU WORRIED THAT YOUR FOOD WOULD RUN OUT BEFORE YOU GOT MONEY TO BUY MORE.: NEVER TRUE

## 2024-08-29 SDOH — ECONOMIC STABILITY: INCOME INSECURITY: HOW HARD IS IT FOR YOU TO PAY FOR THE VERY BASICS LIKE FOOD, HOUSING, MEDICAL CARE, AND HEATING?: NOT HARD AT ALL

## 2024-08-29 ASSESSMENT — PATIENT HEALTH QUESTIONNAIRE - PHQ9
SUM OF ALL RESPONSES TO PHQ9 QUESTIONS 1 & 2: 0
SUM OF ALL RESPONSES TO PHQ QUESTIONS 1-9: 0
2. FEELING DOWN, DEPRESSED OR HOPELESS: NOT AT ALL
1. LITTLE INTEREST OR PLEASURE IN DOING THINGS: NOT AT ALL
SUM OF ALL RESPONSES TO PHQ QUESTIONS 1-9: 0

## 2024-08-29 NOTE — PROGRESS NOTES
HPI     Chief Complaint   Patient presents with    Establish Care     She is a 59 y.o. female who presents to [facility] care.    Establishing Care    Pmhx :     Parox aflutter s/p ablation. Followed by cardiology.    Hx TIA in 2017.  On ASA daily.     HLD    Depression and anxiety.  ADD. Followed by psychiatry.     GERD. Following with GI. On protonix trial. Recent EGD indicating gastritis.   Chronic constipation, uses miralax nightly.     Diet is generally healthy.   Walks regularly for exercise.      - Chronic medical problems:  Past Medical History:   Diagnosis Date    ADHD (attention deficit hyperactivity disorder)     Would like to discuss    Depression 12/2008    GERD (gastroesophageal reflux disease)     Menopause 2006    Paroxysmal atrial flutter (HCC)     resolved with ablation 2/2010 dr deluca    Stroke (HCC)     TIA; no deficits     - Current medications:   Current Outpatient Medications   Medication Sig    simethicone (MYLICON) 80 MG chewable tablet Take 1 tablet by mouth every 6 hours as needed for Flatulence    NONFORMULARY Apple cider gummies    MAGNESIUM PO Take 200 mg by mouth    NONFORMULARY Align probiotic    ibuprofen (ADVIL;MOTRIN) 200 MG tablet Take 1 tablet by mouth every 6 hours as needed for Pain    Lisdexamfetamine Dimesylate 40 MG CAPS Take 30 mg by mouth every morning.    ALPRAZolam (XANAX) 0.25 MG tablet TAKE 1/2 TO 1 TABLET BY MOUTH TWICE A DAY AS NEEDED FOR ANXIETY    buPROPion (WELLBUTRIN SR) 200 MG extended release tablet TAKE 2 TABLETS DAILY FOR ANXIETY WITH DEPRESSION.    famotidine (PEPCID) 20 MG tablet Take by mouth daily    fexofenadine (ALLEGRA) 180 MG tablet Take by mouth daily     No current facility-administered medications for this visit.     - Family history:   Family History   Problem Relation Age of Onset    Heart Disease Paternal Grandmother         older age related chf     Migraines Sister     Cancer Paternal Grandfather         leukemia     Alzheimer's Disease    Neurological:      Mental Status: She is alert.                Assessment / Plan     Apple was seen today for establish care.    Diagnoses and all orders for this visit:    Macrocytosis  -     Comprehensive Metabolic Panel; Future  -     CBC; Future  -     Vitamin B12; Future  -     TSH; Future  -     Path Review, Smear; Future    History of TIA (transient ischemic attack)  -     Lipid Panel; Future  -     Comprehensive Metabolic Panel; Future    Vitamin D deficiency    Hyperlipidemia, unspecified hyperlipidemia type  -     Lipid Panel; Future  -     Comprehensive Metabolic Panel; Future       No follow-up provider specified.    Obtain medical records    I have discussed the diagnosis with the patient and the intended plan as seen in the above orders.    I have discussed medication side effects and warnings with the patient as well.    Tessa Barr, DO

## 2024-08-29 NOTE — PROGRESS NOTES
\"Have you been to the ER, urgent care clinic since your last visit?  Hospitalized since your last visit?\"    NO    “Have you seen or consulted any other health care providers outside of [facility] since your last visit?”    NO    Have you had a mammogram?”     06/2023 Skyler Murillo MD  Date of last Mammogram: 7/27/2021             Click Here for Release of Records Request

## 2024-09-06 DIAGNOSIS — D75.89 MACROCYTOSIS: ICD-10-CM

## 2024-09-07 LAB
ALBUMIN SERPL-MCNC: 4.3 G/DL (ref 3.8–4.9)
ALP SERPL-CCNC: 77 IU/L (ref 44–121)
ALT SERPL-CCNC: 34 IU/L (ref 0–32)
AST SERPL-CCNC: 31 IU/L (ref 0–40)
BILIRUB SERPL-MCNC: 0.5 MG/DL (ref 0–1.2)
BUN SERPL-MCNC: 19 MG/DL (ref 6–24)
BUN/CREAT SERPL: 19 (ref 9–23)
CALCIUM SERPL-MCNC: 9.5 MG/DL (ref 8.7–10.2)
CHLORIDE SERPL-SCNC: 103 MMOL/L (ref 96–106)
CHOLEST SERPL-MCNC: 185 MG/DL (ref 100–199)
CO2 SERPL-SCNC: 23 MMOL/L (ref 20–29)
CREAT SERPL-MCNC: 0.99 MG/DL (ref 0.57–1)
EGFRCR SERPLBLD CKD-EPI 2021: 66 ML/MIN/1.73
ERYTHROCYTE [DISTWIDTH] IN BLOOD BY AUTOMATED COUNT: 11.7 % (ref 11.7–15.4)
GLOBULIN SER CALC-MCNC: 2.1 G/DL (ref 1.5–4.5)
GLUCOSE SERPL-MCNC: 85 MG/DL (ref 70–99)
HCT VFR BLD AUTO: 40.1 % (ref 34–46.6)
HDLC SERPL-MCNC: 86 MG/DL
HGB BLD-MCNC: 13.1 G/DL (ref 11.1–15.9)
LDLC SERPL CALC-MCNC: 89 MG/DL (ref 0–99)
MCH RBC QN AUTO: 33 PG (ref 26.6–33)
MCHC RBC AUTO-ENTMCNC: 32.7 G/DL (ref 31.5–35.7)
MCV RBC AUTO: 101 FL (ref 79–97)
PERIPHERAL SMEAR, MD REVIEW: NORMAL
PLATELET # BLD AUTO: 228 X10E3/UL (ref 150–450)
POTASSIUM SERPL-SCNC: 4.5 MMOL/L (ref 3.5–5.2)
PROT SERPL-MCNC: 6.4 G/DL (ref 6–8.5)
RBC # BLD AUTO: 3.97 X10E6/UL (ref 3.77–5.28)
SODIUM SERPL-SCNC: 141 MMOL/L (ref 134–144)
TRIGL SERPL-MCNC: 51 MG/DL (ref 0–149)
TSH SERPL DL<=0.005 MIU/L-ACNC: 1.67 UIU/ML (ref 0.45–4.5)
VLDLC SERPL CALC-MCNC: 10 MG/DL (ref 5–40)
WBC # BLD AUTO: 4.6 X10E3/UL (ref 3.4–10.8)

## 2024-09-08 LAB — VIT B12 SERPL-MCNC: 524 PG/ML (ref 232–1245)

## 2025-01-23 ENCOUNTER — TELEPHONE (OUTPATIENT)
Dept: PRIMARY CARE CLINIC | Facility: CLINIC | Age: 60
End: 2025-01-23

## 2025-01-23 NOTE — TELEPHONE ENCOUNTER
----- Message from Pattie WANG sent at 1/22/2025  4:35 PM EST -----  Regarding: ECC Appointment Request  ECC Appointment Request    Patient needs appointment for ECC Appointment Type: New to Provider.    Patient Requested Dates(s):  Earliest appointment available   Patient Requested Time:  Late afternoon   Provider Name: Libby Schneider PA-C    Reason for Appointment Request: New Patient - Requested Provider unavailable.    Patient is requesting to set an appointment to be establish with PCP Libby Schneider PA-C.  --------------------------------------------------------------------------------------------------------------------------    Relationship to Patient: Self     Call Back Information: OK to leave message on [employer]  Preferred Call Back Number: Phone [phone]

## 2025-01-23 NOTE — TELEPHONE ENCOUNTER
----- Message from Pattie WANG sent at 1/22/2025  4:35 PM EST -----  Regarding: ECC Appointment Request  ECC Appointment Request    Patient needs appointment for ECC Appointment Type: New to Provider.    Patient Requested Dates(s):  Earliest appointment available   Patient Requested Time:  Late afternoon   Provider Name: Libby Schneider PA-C    Reason for Appointment Request: New Patient - Requested Provider unavailable.    Patient is requesting to set an appointment to be establish with PCP Libby Schneider PA-C.  --------------------------------------------------------------------------------------------------------------------------    Relationship to Patient: Self     Call Back Information: OK to leave message on [employer]  Preferred Call Back Number: Phone [phone]

## 2025-05-08 ENCOUNTER — TELEPHONE (OUTPATIENT)
Dept: PRIMARY CARE CLINIC | Facility: CLINIC | Age: 60
End: 2025-05-08

## 2025-05-08 NOTE — TELEPHONE ENCOUNTER
Patient came into the office thinking she has an appointment today explain to the patient that I did not see her on the scheduled. Went into the patient chart to see what may have happen during scheduling process sow that message was sent to us from [facility] that patient wanted to make an appointment with Libby Schneider but wanted sooner dated then today date on May 8th. Explain to the patient that looks like message was sent to use but no appointment was made apologize but told her we called her the next day to schedule her an appointment but no answer and message was left for patient to call. Offer to schedule the patient the next available but patient walk out of the office saying I f** hate this office.

## 2025-06-03 PROBLEM — I48.0 PAROXYSMAL A-FIB (HCC): Status: ACTIVE | Noted: 2025-06-03

## 2025-06-03 NOTE — PROGRESS NOTES
[facility]   [address], [address]  [place] [address]  P: [phone]  F: [phone]    SUBJECTIVE     HPI:     Apple Moseley is a 60 y.o. female who is seen in the clinic for   Chief Complaint   Patient presents with    Annual Exam    New Patient        The patient presents to the office today for a physical exam    Hyperlipidemia, unspecified hyperlipidemia type  Follows a healthy diet. She walks regularly and has started using a weighted vest.    Macrocytosis  Has been monitoring this. B12 was normal with last labs.    History of atrial flutter, History of radiofrequency ablation procedure for cardiac arrhythmia, History of TIA (transient ischemic attack)  She was released by cardiology. Not on aspirin.     Anxiety and depression, Attention deficit hyperactivity disorder (ADHD), combined type  Currently on Vyvanse 50 mg, Wellbutrin  mg, and Xanax 0.25 mg prn. Followed by psychiatry every 3 months.     Gastroesophageal reflux disease, unspecified whether esophagitis present  Followed by GI. On Pepcid 20 mg. Also limiting trigger foods.  Also seeing GI for constipation/incomplete bowel emptying. On Miralax nightly.      Osteopenia  Last bone density scan was in 2023 through GYN. Walking regularly. Not taking calcium or vitamin D.    Would like a referral to a dermatologist.    Health screenings:   Eye exam Done annually  Dental exam Done every 6 months  PAP smear Not applicable - hysterectomy  Mammogram will schedule through GYN  Colon cancer screening Done 2/9/2024, repeat 3 years  Tdap Done 7/20/2015    COVID vaccine Done 2023    Shingles vaccine  Done 8/28/2020, second dose today  Pneumonia vaccine  done today    Patient Active Problem List   Diagnosis    Tension headache    Vertebrobasilar artery syndrome    Perennial allergic rhinitis    Cervicalgia    Anxiety and depression    DDD (degenerative disc disease), cervical    Trigger point of neck    Paroxysmal A-fib (HCC)

## 2025-06-04 ENCOUNTER — OFFICE VISIT (OUTPATIENT)
Dept: PRIMARY CARE CLINIC | Facility: CLINIC | Age: 60
End: 2025-06-04
Payer: COMMERCIAL

## 2025-06-04 VITALS
RESPIRATION RATE: 17 BRPM | SYSTOLIC BLOOD PRESSURE: 120 MMHG | DIASTOLIC BLOOD PRESSURE: 78 MMHG | HEIGHT: 68 IN | BODY MASS INDEX: 19.55 KG/M2 | WEIGHT: 129 LBS | HEART RATE: 66 BPM | OXYGEN SATURATION: 100 % | TEMPERATURE: 97.5 F

## 2025-06-04 DIAGNOSIS — Z12.83 SKIN CANCER SCREENING: ICD-10-CM

## 2025-06-04 DIAGNOSIS — F90.2 ATTENTION DEFICIT HYPERACTIVITY DISORDER (ADHD), COMBINED TYPE: ICD-10-CM

## 2025-06-04 DIAGNOSIS — F41.9 ANXIETY AND DEPRESSION: ICD-10-CM

## 2025-06-04 DIAGNOSIS — E78.5 HYPERLIPIDEMIA, UNSPECIFIED HYPERLIPIDEMIA TYPE: ICD-10-CM

## 2025-06-04 DIAGNOSIS — M19.041 PRIMARY OSTEOARTHRITIS OF BOTH HANDS: ICD-10-CM

## 2025-06-04 DIAGNOSIS — F32.A ANXIETY AND DEPRESSION: ICD-10-CM

## 2025-06-04 DIAGNOSIS — M85.80 OSTEOPENIA, UNSPECIFIED LOCATION: ICD-10-CM

## 2025-06-04 DIAGNOSIS — Z00.00 ANNUAL PHYSICAL EXAM: Primary | ICD-10-CM

## 2025-06-04 DIAGNOSIS — D75.89 MACROCYTOSIS: ICD-10-CM

## 2025-06-04 DIAGNOSIS — Z00.00 ANNUAL PHYSICAL EXAM: ICD-10-CM

## 2025-06-04 DIAGNOSIS — Z86.73 HISTORY OF TIA (TRANSIENT ISCHEMIC ATTACK): ICD-10-CM

## 2025-06-04 DIAGNOSIS — K21.9 GASTROESOPHAGEAL REFLUX DISEASE, UNSPECIFIED WHETHER ESOPHAGITIS PRESENT: ICD-10-CM

## 2025-06-04 DIAGNOSIS — Z98.890 HISTORY OF RADIOFREQUENCY ABLATION PROCEDURE FOR CARDIAC ARRHYTHMIA: ICD-10-CM

## 2025-06-04 DIAGNOSIS — Z23 ENCOUNTER FOR IMMUNIZATION: ICD-10-CM

## 2025-06-04 DIAGNOSIS — M19.042 PRIMARY OSTEOARTHRITIS OF BOTH HANDS: ICD-10-CM

## 2025-06-04 DIAGNOSIS — Z86.79 HISTORY OF ATRIAL FLUTTER: ICD-10-CM

## 2025-06-04 PROCEDURE — 90471 IMMUNIZATION ADMIN: CPT

## 2025-06-04 PROCEDURE — 99396 PREV VISIT EST AGE 40-64: CPT

## 2025-06-04 PROCEDURE — 99213 OFFICE O/P EST LOW 20 MIN: CPT

## 2025-06-04 PROCEDURE — 90677 PCV20 VACCINE IM: CPT

## 2025-06-04 RX ORDER — LISDEXAMFETAMINE DIMESYLATE 50 MG/1
50 CAPSULE ORAL EVERY MORNING
COMMUNITY

## 2025-06-04 SDOH — ECONOMIC STABILITY: FOOD INSECURITY: WITHIN THE PAST 12 MONTHS, YOU WORRIED THAT YOUR FOOD WOULD RUN OUT BEFORE YOU GOT MONEY TO BUY MORE.: NEVER TRUE

## 2025-06-04 SDOH — ECONOMIC STABILITY: FOOD INSECURITY: WITHIN THE PAST 12 MONTHS, THE FOOD YOU BOUGHT JUST DIDN'T LAST AND YOU DIDN'T HAVE MONEY TO GET MORE.: NEVER TRUE

## 2025-06-04 ASSESSMENT — PATIENT HEALTH QUESTIONNAIRE - PHQ9
3. TROUBLE FALLING OR STAYING ASLEEP: NOT AT ALL
2. FEELING DOWN, DEPRESSED OR HOPELESS: NOT AT ALL
4. FEELING TIRED OR HAVING LITTLE ENERGY: NOT AT ALL
1. LITTLE INTEREST OR PLEASURE IN DOING THINGS: NOT AT ALL
SUM OF ALL RESPONSES TO PHQ QUESTIONS 1-9: 0
6. FEELING BAD ABOUT YOURSELF - OR THAT YOU ARE A FAILURE OR HAVE LET YOURSELF OR YOUR FAMILY DOWN: NOT AT ALL
9. THOUGHTS THAT YOU WOULD BE BETTER OFF DEAD, OR OF HURTING YOURSELF: NOT AT ALL
SUM OF ALL RESPONSES TO PHQ QUESTIONS 1-9: 0
7. TROUBLE CONCENTRATING ON THINGS, SUCH AS READING THE NEWSPAPER OR WATCHING TELEVISION: NOT AT ALL
8. MOVING OR SPEAKING SO SLOWLY THAT OTHER PEOPLE COULD HAVE NOTICED. OR THE OPPOSITE, BEING SO FIGETY OR RESTLESS THAT YOU HAVE BEEN MOVING AROUND A LOT MORE THAN USUAL: NOT AT ALL
5. POOR APPETITE OR OVEREATING: NOT AT ALL
10. IF YOU CHECKED OFF ANY PROBLEMS, HOW DIFFICULT HAVE THESE PROBLEMS MADE IT FOR YOU TO DO YOUR WORK, TAKE CARE OF THINGS AT HOME, OR GET ALONG WITH OTHER PEOPLE: NOT DIFFICULT AT ALL

## 2025-06-04 ASSESSMENT — ENCOUNTER SYMPTOMS
WHEEZING: 0
VOMITING: 0
SHORTNESS OF BREATH: 0
CONSTIPATION: 1
SORE THROAT: 0
NAUSEA: 0
ABDOMINAL PAIN: 0
BLOOD IN STOOL: 0
DIARRHEA: 0
COUGH: 0
SINUS PAIN: 0
RHINORRHEA: 0

## 2025-06-04 NOTE — PROGRESS NOTES
Health Decision Maker has been checked with the patient          Chief Complaint   Patient presents with    Annual Exam    New Patient       \"Have you been to the ER, urgent care clinic since your last visit?  Hospitalized since your last visit?\"    NO    “Have you seen or consulted any other health care providers outside of [facility] since your last visit?”    NO      Vitals:    06/04/25 0847   Temp: 97.5 °F (36.4 °C)   TempSrc: Temporal   Weight: 58.5 kg (129 lb)   Height: 1.727 m (5' 8\")         Have you had a mammogram?”     Yes, patient can't remember where she went.    Date of last Mammogram: 7/27/2021             Click Here for Release of Records Request

## 2025-06-05 LAB
ALBUMIN SERPL-MCNC: 4.6 G/DL (ref 3.8–4.9)
ALP SERPL-CCNC: 98 IU/L (ref 44–121)
ALT SERPL-CCNC: 29 IU/L (ref 0–32)
AST SERPL-CCNC: 31 IU/L (ref 0–40)
BASOPHILS # BLD AUTO: 0 X10E3/UL (ref 0–0.2)
BASOPHILS NFR BLD AUTO: 1 %
BILIRUB SERPL-MCNC: 0.5 MG/DL (ref 0–1.2)
BUN SERPL-MCNC: 14 MG/DL (ref 6–24)
BUN/CREAT SERPL: 16 (ref 9–23)
CALCIUM SERPL-MCNC: 9.5 MG/DL (ref 8.7–10.2)
CHLORIDE SERPL-SCNC: 104 MMOL/L (ref 96–106)
CHOLEST SERPL-MCNC: 226 MG/DL (ref 100–199)
CO2 SERPL-SCNC: 20 MMOL/L (ref 20–29)
CREAT SERPL-MCNC: 0.88 MG/DL (ref 0.57–1)
EGFRCR SERPLBLD CKD-EPI 2021: 76 ML/MIN/1.73
EOSINOPHIL # BLD AUTO: 0.1 X10E3/UL (ref 0–0.4)
EOSINOPHIL NFR BLD AUTO: 2 %
ERYTHROCYTE [DISTWIDTH] IN BLOOD BY AUTOMATED COUNT: 11.5 % (ref 11.7–15.4)
GLOBULIN SER CALC-MCNC: 2.3 G/DL (ref 1.5–4.5)
GLUCOSE SERPL-MCNC: 105 MG/DL (ref 70–99)
HCT VFR BLD AUTO: 43 % (ref 34–46.6)
HDLC SERPL-MCNC: 93 MG/DL
HGB BLD-MCNC: 14.3 G/DL (ref 11.1–15.9)
IMM GRANULOCYTES # BLD AUTO: 0 X10E3/UL (ref 0–0.1)
IMM GRANULOCYTES NFR BLD AUTO: 0 %
LDLC SERPL CALC-MCNC: 122 MG/DL (ref 0–99)
LYMPHOCYTES # BLD AUTO: 1.3 X10E3/UL (ref 0.7–3.1)
LYMPHOCYTES NFR BLD AUTO: 36 %
MCH RBC QN AUTO: 33.7 PG (ref 26.6–33)
MCHC RBC AUTO-ENTMCNC: 33.3 G/DL (ref 31.5–35.7)
MCV RBC AUTO: 101 FL (ref 79–97)
MONOCYTES # BLD AUTO: 0.3 X10E3/UL (ref 0.1–0.9)
MONOCYTES NFR BLD AUTO: 9 %
NEUTROPHILS # BLD AUTO: 1.9 X10E3/UL (ref 1.4–7)
NEUTROPHILS NFR BLD AUTO: 52 %
PATH INTERP BLD-IMP: ABNORMAL
PATH REV BLD -IMP: ABNORMAL
PATHOLOGIST NAME: ABNORMAL
PLATELET # BLD AUTO: 216 X10E3/UL (ref 150–450)
POTASSIUM SERPL-SCNC: 4.8 MMOL/L (ref 3.5–5.2)
PROT SERPL-MCNC: 6.9 G/DL (ref 6–8.5)
RBC # BLD AUTO: 4.24 X10E6/UL (ref 3.77–5.28)
SODIUM SERPL-SCNC: 142 MMOL/L (ref 134–144)
TRIGL SERPL-MCNC: 66 MG/DL (ref 0–149)
TSH SERPL DL<=0.005 MIU/L-ACNC: 1.61 UIU/ML (ref 0.45–4.5)
VIT B12 SERPL-MCNC: 353 PG/ML (ref 232–1245)
VLDLC SERPL CALC-MCNC: 11 MG/DL (ref 5–40)
WBC # BLD AUTO: 3.7 X10E3/UL (ref 3.4–10.8)

## 2025-06-09 ENCOUNTER — PATIENT MESSAGE (OUTPATIENT)
Dept: PRIMARY CARE CLINIC | Facility: CLINIC | Age: 60
End: 2025-06-09

## 2025-06-09 ENCOUNTER — RESULTS FOLLOW-UP (OUTPATIENT)
Dept: PRIMARY CARE CLINIC | Facility: CLINIC | Age: 60
End: 2025-06-09

## 2025-06-09 DIAGNOSIS — R73.09 ELEVATED GLUCOSE: Primary | ICD-10-CM

## 2025-06-09 DIAGNOSIS — R73.09 ELEVATED GLUCOSE: ICD-10-CM

## 2025-06-09 DIAGNOSIS — E78.5 HYPERLIPIDEMIA, UNSPECIFIED HYPERLIPIDEMIA TYPE: Primary | ICD-10-CM

## 2025-06-10 LAB — SPECIMEN STATUS REPORT: NORMAL

## 2025-06-11 LAB — HBA1C MFR BLD: 5.4 % (ref 4.8–5.6)

## 2025-06-30 ENCOUNTER — HOSPITAL ENCOUNTER (OUTPATIENT)
Facility: HOSPITAL | Age: 60
Discharge: HOME | End: 2025-07-03

## 2025-06-30 DIAGNOSIS — E78.5 HYPERLIPIDEMIA, UNSPECIFIED HYPERLIPIDEMIA TYPE: ICD-10-CM

## 2025-06-30 PROCEDURE — 75571 CT HRT W/O DYE W/CA TEST: CPT

## 2025-07-01 ENCOUNTER — PATIENT MESSAGE (OUTPATIENT)
Dept: PRIMARY CARE CLINIC | Facility: CLINIC | Age: 60
End: 2025-07-01

## 2025-07-01 ENCOUNTER — RESULTS FOLLOW-UP (OUTPATIENT)
Dept: PRIMARY CARE CLINIC | Facility: CLINIC | Age: 60
End: 2025-07-01

## (undated) DEVICE — SNARE POLYP SM AD W13MMXL240CM SHTH DIA2.4MM HEX STIFF

## (undated) DEVICE — Device: Brand: SINGLE USE INJECTOR NM600/610

## (undated) DEVICE — BASKET SPEC RETRV L230CM W22MM ODSEC2.5MM 3 DIM NET ROT

## (undated) DEVICE — TRAP SURG QUAD PARABOLA SLOT DSGN SFTY SCRN TRAPEASE

## (undated) DEVICE — FIAPC® PROBE W/ FILTER 2200 C OD 2.3MM/6.9FR; L 2.2M/7.2FT: Brand: ERBE

## (undated) DEVICE — ELECTRODE PT RET AD L9FT HI MOIST COND ADH HYDRGEL CORDED

## (undated) DEVICE — MACROLYTE DISPERSIVE ELECTRODE: Brand: MACROLYTE

## (undated) DEVICE — Device